# Patient Record
Sex: FEMALE | Race: WHITE | Employment: PART TIME | URBAN - METROPOLITAN AREA
[De-identification: names, ages, dates, MRNs, and addresses within clinical notes are randomized per-mention and may not be internally consistent; named-entity substitution may affect disease eponyms.]

---

## 2017-02-17 ENCOUNTER — RADIANT APPOINTMENT (OUTPATIENT)
Dept: MAMMOGRAPHY | Facility: CLINIC | Age: 77
End: 2017-02-17

## 2017-02-17 DIAGNOSIS — Z12.31 VISIT FOR SCREENING MAMMOGRAM: ICD-10-CM

## 2017-03-22 ENCOUNTER — TELEPHONE (OUTPATIENT)
Dept: DERMATOLOGY | Facility: CLINIC | Age: 77
End: 2017-03-22

## 2017-03-22 NOTE — TELEPHONE ENCOUNTER
Patient feels that she is being bit in the night by a bug of some sort. Right now she has 4 small red raised bumps on her upper thigh that itch. Denies flaking or bleeding. She sates this has happened 3 different times now in the past few months. She uses hydrocortisone and areas resolve within a week.  sleeps in same bed and has not had anything similar. Patient is wondering if she should come into clinic for evaluation of spots to see if they are bug bites.    Routing to provider for recommendations.

## 2017-03-22 NOTE — TELEPHONE ENCOUNTER
----- Message from Kade Paez sent at 3/22/2017  3:17 PM CDT -----  Regarding: CALL BACK  Contact: [phone]  Pt is requesting a call back concerning, some medical questions.    Thank you  JH

## 2017-03-23 NOTE — TELEPHONE ENCOUNTER
Left message with  for patient to call clinic. When patient calls back please assist her in scheduling an appointment with Dr Rinaldi (gave okay to add to clinic) or to be seen with Jojo.

## 2017-03-29 NOTE — TELEPHONE ENCOUNTER
Left message on voice mail for patient to call clinic. If patient does not call back at end of day encounter to be closed per protocol.

## 2017-09-27 ENCOUNTER — OFFICE VISIT (OUTPATIENT)
Dept: DERMATOLOGY | Facility: CLINIC | Age: 77
End: 2017-09-27

## 2017-09-27 DIAGNOSIS — D22.9 MULTIPLE NEVI: ICD-10-CM

## 2017-09-27 DIAGNOSIS — Z85.828 HISTORY OF SKIN CANCER: Primary | ICD-10-CM

## 2017-09-27 RX ORDER — HYDROXYZINE HYDROCHLORIDE 10 MG/5ML
SYRUP ORAL
COMMUNITY
Start: 2017-02-16

## 2017-09-27 RX ORDER — ESZOPICLONE 2 MG/1
TABLET, FILM COATED ORAL
COMMUNITY

## 2017-09-27 ASSESSMENT — PAIN SCALES - GENERAL: PAINLEVEL: NO PAIN (0)

## 2017-09-27 NOTE — PROGRESS NOTES
"[facility] Dermatology Note    Dermatology Problem List:  1. Alopecia.   2. Nodular basal cell carcinoma, left anterior base of neck, status post Mohs 10/09/2013.   3. Recurrent chest/neck rash--resolved  4. Nevus to monitor, right posterior shoulder    Encounter Date: Sep 27, 2017    CC:   No chief complaint on file.    History of Present Illness:  Ms. Rosalva Ellsworth is a 77 year old female who returns for follow-up. She was last seen on 9/14/16 and had continued complaints of chest/neck asymptomatic rash that present after sun exposure, but no evidence of skin changes were seen at that time.    She no longer complains of neck rash.  Denies tender, bleeding, not healing or otherwise symptomatic lesions.  Complains of \"keratoses\", specifically a small one on the right upper forehead.      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       Family History:  There is no family history of skin cancer.    Medications:  Current Outpatient Prescriptions   Medication Sig Dispense Refill     traZODone (DESYREL) 50 MG tablet Take 50 mg by mouth       simvastatin (ZOCOR) 20 MG tablet Take 20 mg by mouth       estradiol (VAGIFEM) 10 MCG TABS Place 10 mcg vaginally       fluticasone (FLONASE) 50 MCG/ACT nasal spray        cholecalciferol 2000 UNITS tablet Take 2,000 Units by mouth       calcium carbonate (OS-DIVYA 500 MG [place]. [place]) 500 MG tablet Take 500 mg by mouth       dorzolamide-timolol (COSOPT) 2-0.5 % ophthalmic solution        dicyclomine (BENTYL) 10 MG capsule Take 10 mg by mouth       Levothyroxine Sodium (SYNTHROID PO) Take 80 mg " by mouth daily       atorvastatin (LIPITOR) 10 MG tablet Take 10 mg by mouth daily.          Allergies   Allergen Reactions     Penicillins Hives     Sulfa Drugs Unknown     rash     Review of Systems:  General: No recent infections, fevers, chills, malaise, arthralgias, unexplained weight/appetite changes  Skin: No new/changing moles, nothing bleeding/nonhealing/painful/tender/rapidly-growing.  Lymphatic: No subcutaneous lumps/bumps.    Physical exam:  Vitals: There were no vitals taken for this visit.  GEN: Well-appearing female, no discomfort or distress, cooperative with the exam  SKIN: Total body examination of the scalp, face, ears, chest, back, abdomen, bilateral upper and lower extremities, mouth and nails:  - phototype II, mild-moderate dermatoheliosis, inreased laxity on the proximal arms, volar forearms.  - R posterior shoulder: 4 mm light brown macule with a central clearing of a reticular pigment network and 4 pigment granules at the 9 o'clock aspect.   - R distal medial thigh: 5 mm medium brown discreet macule, no concerning dermoscopic features   - scattered hyperpigmented macules on sun-exposed areas without pigment network  - well-healed scar at anterior left base of neck  - No other lesions of concern on areas examined.     Impression/Plan:  1. H/o NMSC  - no evidence of recurrent disease    2. Multiple melanocytic nevi. No concerning features today.  - advised daily photoprotection with SPF 30+ sunscreen  - reviewed the ABCDE of melanoma    3. Seborrheic keratoses  - Discussed etiology, natural history, and reassured of the benign nature of these lesions. No treatment required.    Follow-up: 1 year.    Discussed and examined with [facility] staff dermatologist Dr. Frances Rinaldi.    Derrick German MD  PGY-2 Dermatology  (p) [phone]        .I, Frances Rinaldi MD, saw this patient with the resident and agree with the resident s findings and plan of care as documented in the resident s  note.

## 2017-09-27 NOTE — NURSING NOTE
Dermatology Rooming Note    Rosalva Ellsworth's goals for this visit include:   Chief Complaint   Patient presents with     Skin Check     Rosalva is here today for a skin check. No concerns     TAMMY Thurman

## 2017-09-27 NOTE — MR AVS SNAPSHOT
After Visit Summary   2017    Rosalva Ellsworth    MRN: [id number]           Patient Information     Date Of Birth          1940        Visit Information        Provider Department      2017 11:15 AM Frances Rinaldi MD [facility] Dermatology        Care Instructions    Your skin looks great and no concerning spots were identified!            Follow-ups after your visit        Follow-up notes from your care team     Return in about 1 year (around 2018).      Who to contact     Please call your clinic at [phone] to:    Ask questions about your health    Make or cancel appointments    Discuss your medicines    Learn about your test results    Speak to your doctor   If you have compliments or concerns about an experience at your clinic, or if you wish to file a complaint, please contact [facility] Patient Relations at [phone] or email us at [email or web address]         Additional Information About Your Visit        MyChart Information     [employer] is an electronic gateway that provides easy, online access to your medical records. With [employer], you can request a clinic appointment, read your test results, renew a prescription or communicate with your care team.     To sign up for [employer] visit the website at [email or web address]   You will be asked to enter the access code listed below, as well as some personal information. Please follow the directions to create your username and password.     Your access code is: [email or web address]  Expires: 12/3/2017  6:30 AM     Your access code will  in 90 days. If you need help or a new code, please contact your [facility] Clinic or call [phone] for assistance.        Care EveryWhere ID     This is your Care EveryWhere ID. This could be used by other organizations to access your [facility] medical records  [id number]         Blood Pressure from Last 3  Encounters:   09/14/16 120/70   05/03/16 109/65   03/03/15 132/79    Weight from Last 3 Encounters:   05/03/16 55.5 kg (122 lb 6.4 oz)   03/03/15 54.9 kg (121 lb)   08/20/14 57.3 kg (126 lb 6.4 oz)              Today, you had the following     No orders found for display       Primary Care Provider Office Phone # Fax #    Otf Sanders [phone]       [facility] [address] [facility] [address]  [facility] [address]        Equal Access to Services     [facility]: Hadii aad ku hadasho Soomaali, waaxda luqadaha, qaybta kaalmada adeegyada, josee appiah . So [facility] [phone].    ATENCIÓN: Si habla español, tiene a figueroa disposición servicios gratuitos de asistencia lingüística. [facility] [phone].    We comply with applicable federal civil rights laws and [place] laws. We do not discriminate on the basis of race, color, national origin, age, disability sex, sexual orientation or gender identity.            Thank you!     Thank you for choosing [facility] DERMATOLOGY  for your care. Our goal is always to provide you with excellent care. Hearing back from our patients is one way we can continue to improve our services. Please take a few minutes to complete the written survey that you may receive in the mail after your visit with us. Thank you!             Your Updated Medication List - Protect others around you: Learn how to safely use, store and throw away your medicines at www.disposemymeds.org.          This list is accurate as of: 9/27/17 11:34 AM.  Always use your most recent med list.                   Brand Name Dispense Instructions for use Diagnosis    calcium carbonate 1250 MG tablet    OS-DIVYA 500 mg [place]. [place]     Take 500 mg by mouth        cholecalciferol 2000 UNITS tablet      Take 2,000 Units by mouth        dicyclomine 10 MG capsule    BENTYL     Take 10 mg by mouth        dorzolamide-timolol 2-0.5 % ophthalmic solution    COSOPT          estradiol 10 MCG  Tabs vaginal tablet    VAGIFEM     Place 10 mcg vaginally        eszopiclone 2 MG tablet    LUNESTA          fluticasone 50 MCG/ACT spray    FLONASE          LIPITOR 10 MG tablet   Generic drug:  atorvastatin      Take 10 mg by mouth daily.        simvastatin 20 MG tablet    ZOCOR     Take 20 mg by mouth        SYNTHROID PO      Take 80 mg by mouth daily        traZODone 50 MG tablet    DESYREL     Take 50 mg by mouth        WAL-FINATE 4 MG tablet   Generic drug:  chlorpheniramine      TK 1 T PO HS PRF ALLERGIES.

## 2017-09-27 NOTE — LETTER
"9/27/2017       RE: Rosalva Ellsworth  [address] [place] [facility] [address]     Dear Colleague,    Thank you for referring your patient, Rosalva Ellsworth, to the [facility] DERMATOLOGY at [facility]. Please see a copy of my visit note below.    [facility] Dermatology Note    Dermatology Problem List:  1. Alopecia.   2. Nodular basal cell carcinoma, left anterior base of neck, status post Mohs 10/09/2013.   3. Recurrent chest/neck rash--resolved  4. Nevus to monitor, right posterior shoulder    Encounter Date: Sep 27, 2017    CC:   No chief complaint on file.    History of Present Illness:  Ms. Rosalva Ellsworth is a 77 year old female who returns for follow-up. She was last seen on 9/14/16 and had continued complaints of chest/neck asymptomatic rash that present after sun exposure, but no evidence of skin changes were seen at that time.    She no longer complains of neck rash.  Denies tender, bleeding, not healing or otherwise symptomatic lesions.  Complains of \"keratoses\", specifically a small one on the right upper forehead.      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       Family History:  There is no family history of skin cancer.    Medications:  Current Outpatient Prescriptions   Medication Sig Dispense Refill     traZODone (DESYREL) 50 MG tablet Take 50 mg by mouth       simvastatin (ZOCOR) 20 MG tablet Take 20 mg by mouth       estradiol (VAGIFEM) 10 MCG TABS Place 10 mcg vaginally       fluticasone (FLONASE) 50 MCG/ACT nasal spray        cholecalciferol 2000 " UNITS tablet Take 2,000 Units by mouth       calcium carbonate (OS-DIVYA 500 MG [place]. [place]) 500 MG tablet Take 500 mg by mouth       dorzolamide-timolol (COSOPT) 2-0.5 % ophthalmic solution        dicyclomine (BENTYL) 10 MG capsule Take 10 mg by mouth       Levothyroxine Sodium (SYNTHROID PO) Take 80 mg by mouth daily       atorvastatin (LIPITOR) 10 MG tablet Take 10 mg by mouth daily.          Allergies   Allergen Reactions     Penicillins Hives     Sulfa Drugs Unknown     rash     Review of Systems:  General: No recent infections, fevers, chills, malaise, arthralgias, unexplained weight/appetite changes  Skin: No new/changing moles, nothing bleeding/nonhealing/painful/tender/rapidly-growing.  Lymphatic: No subcutaneous lumps/bumps.    Physical exam:  Vitals: There were no vitals taken for this visit.  GEN: Well-appearing female, no discomfort or distress, cooperative with the exam  SKIN: Total body examination of the scalp, face, ears, chest, back, abdomen, bilateral upper and lower extremities, mouth and nails:  - phototype II, mild-moderate dermatoheliosis, inreased laxity on the proximal arms, volar forearms.  - R posterior shoulder: 4 mm light brown macule with a central clearing of a reticular pigment network and 4 pigment granules at the 9 o'clock aspect.   - R distal medial thigh: 5 mm medium brown discreet macule, no concerning dermoscopic features   - scattered hyperpigmented macules on sun-exposed areas without pigment network  - well-healed scar at anterior left base of neck  - No other lesions of concern on areas examined.     Impression/Plan:  1. H/o NMSC  - no evidence of recurrent disease    2. Multiple melanocytic nevi. No concerning features today.  - advised daily photoprotection with SPF 30+ sunscreen  - reviewed the ABCDE of melanoma    3. Seborrheic keratoses  - Discussed etiology, natural history, and reassured of the benign nature of these lesions. No treatment required.    Follow-up: 1  year.    Discussed and examined with [facility] staff dermatologist Dr. Frances Rinaldi.    Derrick German MD  PGY-2 Dermatology  (p) [phone]        .I, Frances Rinaldi MD, saw this patient with the resident and agree with the resident s findings and plan of care as documented in the resident s note.    Again, thank you for allowing me to participate in the care of your patient.      Sincerely,    Frances Rinaldi MD

## 2018-03-19 ENCOUNTER — RADIANT APPOINTMENT (OUTPATIENT)
Dept: MAMMOGRAPHY | Facility: CLINIC | Age: 78
End: 2018-03-19
Payer: COMMERCIAL

## 2018-03-19 DIAGNOSIS — Z12.31 VISIT FOR SCREENING MAMMOGRAM: ICD-10-CM

## 2018-09-05 ENCOUNTER — OFFICE VISIT (OUTPATIENT)
Dept: DERMATOLOGY | Facility: CLINIC | Age: 78
End: 2018-09-05
Payer: COMMERCIAL

## 2018-09-05 DIAGNOSIS — L57.0 AK (ACTINIC KERATOSIS): Primary | ICD-10-CM

## 2018-09-05 DIAGNOSIS — Z85.828 HISTORY OF SKIN CANCER: ICD-10-CM

## 2018-09-05 DIAGNOSIS — L82.0 INFLAMED SEBORRHEIC KERATOSIS: ICD-10-CM

## 2018-09-05 ASSESSMENT — PAIN SCALES - GENERAL
PAINLEVEL: NO PAIN (0)
PAINLEVEL: NO PAIN (1)

## 2018-09-05 NOTE — LETTER
9/5/2018       RE: Rosalva Ellsworth  [address]  [place] [address]     Dear Colleague,    Thank you for referring your patient, Rosalva Ellsworth, to the [facility] DERMATOLOGY at [facility]. Please see a copy of my visit note below.    [facility] Dermatology Note      Dermatology Problem List:  1. H/o alopecia  2. H/o nodular basal cell carcinoma on left upper chest, s/p Mohs 10/2013, no recurrence  3. Photosensitivity on upper chest  4. Seborrheic keratosis, on chest, back, and legs  5. Actinic keratosis on left ear s/p cryotherapy 9/2018       Encounter Date: Sep 5, 2018    CC:  Chief Complaint   Patient presents with     Derm Problem     Rosalva is here for a skin check, states she has concerning areas around her neck that are sun sensitive.          History of Present Illness:  Ms. Rosalva Ellsworth is a 78 year old female who presents for an annual skin check. She reports multiple rough patches and ongoing photosensitivity on her neck and chest. She notes new keratosis on her right lateral chest that is irritated and itchy. No other skin concerns today.     Past Medical History:   Patient Active Problem List   Diagnosis     Atypical nevi     BCC (basal cell carcinoma), trunk     Skin exam, screening for cancer     Past Medical History:   Diagnosis Date     Basal cell carcinoma      Past Surgical History:   Procedure Laterality Date     MOHS MICROGRAPHIC PROCEDURE         Social History:  Social History     Social History     Marital status:      Spouse name: N/A     Number of children: N/A     Years of education: N/A     Occupational History     Not on file.     Social History Main Topics     Smoking status: Never Smoker     Smokeless tobacco: Never Used     Alcohol use Not on file     Drug use: Not on file     Sexual activity: Not on file     Other Topics Concern     Not on file     Social History Narrative        Family History:  Family History   Problem Relation Age of Onset     Cancer No family hx of      skin cancer     Melanoma No family hx of      Skin Cancer No family hx of        Medications:  Current Outpatient Prescriptions   Medication Sig Dispense Refill     atorvastatin (LIPITOR) 10 MG tablet Take 10 mg by mouth daily.       calcium carbonate (OS-DIVYA 500 MG [place]. [place]) 500 MG tablet Take 500 mg by mouth       chlorpheniramine (WAL-FINATE) 4 MG tablet TK 1 T PO HS PRF ALLERGIES.       cholecalciferol 2000 UNITS tablet Take 2,000 Units by mouth       dicyclomine (BENTYL) 10 MG capsule Take 10 mg by mouth       dorzolamide-timolol (COSOPT) 2-0.5 % ophthalmic solution        estradiol (VAGIFEM) 10 MCG TABS Place 10 mcg vaginally       eszopiclone (LUNESTA) 2 MG tablet        fluticasone (FLONASE) 50 MCG/ACT nasal spray        Levothyroxine Sodium (SYNTHROID PO) Take 80 mg by mouth daily       simvastatin (ZOCOR) 20 MG tablet Take 20 mg by mouth       traZODone (DESYREL) 50 MG tablet Take 50 mg by mouth       Allergies   Allergen Reactions     Penicillins Hives     Sulfa Drugs Unknown     rash         Review of Systems:  - Skin: As above in HPI. No additional skin concerns.    Physical exam:  Vitals: There were no vitals taken for this visit.  GEN: This is a well developed, well-nourished female in no acute distress, in a pleasant mood.    SKIN: Total skin excluding the undergarment areas was performed. The exam included the head/face, neck, both arms, chest, back, abdomen, both legs, digits and/or nails.   - Rough pink papule on upper right ear   - Small white papules across forehead  - Multiple rough pink papules on neck  - Brown waxy stuck-on plaque on right lateral chest, irritated, approximately 1 cm   - Multiple brown waxy stuck-on papules on back and legs  -scattered nevi-benign with stable nevus of right inner thigh.  No atypia on dermoscopy  -No other lesions of concern on areas examined.      Impression/Plan:  1. Actinic keratosis on right upper ear    Cryotherapy procedure note (performed by faculty): After verbal consent and discussion of risks and benefits including but no limited to dyspigmentation/scar, blister, infection, recurrence, upper right ear was treated with 1-2mm freeze border for 2 cycles with liquid nitrogen. Post cryotherapy instructions were provided.     2. Seborrheic keratosis on right lateral chest, symptomatic    Cryotherapy procedure note (performed by faculty): After verbal consent and discussion of risks and benefits including but no limited to dyspigmentation/scar, blister, infection, recurrence, right lateral chest lesion was treated with 1-2mm freeze border for 2 cycles with liquid nitrogen. Post cryotherapy instructions were provided.     3. History of nonmelanoma skin cancer, no clincial evidence of recurrence    Sun precaution was advised including the use of sun screens of SPF 30 or higher, sun protective clothing, and avoidance of tanning beds.    Follow-up in 3 months, earlier for new or changing lesions.     Mitzi Bach ([facility]), MS4    Staff Involved:  .I was present with the medical student who participated in the service and in the documentation of the note. I have verified the history and personally performed the physical exam and medical decision making. I agree with the assessment and plan of care as documented in the note.  Frances Rinaldi MD      Again, thank you for allowing me to participate in the care of your patient.      Sincerely,    Frances Rinaldi MD

## 2018-09-05 NOTE — PATIENT INSTRUCTIONS
Cryotherapy    What is it?    Use of a very cold liquid, such as liquid nitrogen, to freeze and destroy abnormal skin cells that need to be removed    What should I expect?    Tenderness and redness    A small blister that might grow and fill with dark purple blood. There may be crusting.    More than one treatment may be needed if the lesions do not go away.    How do I care for the treated area?    Gently wash the area with your hands when bathing.    Use a thin layer of Vaseline to help with healing. You may use a Band-Aid.     The area should heal within 7-10 days and may leave behind a pink or lighter color.     Do not use an antibiotic or Neosporin ointment.     You may take acetaminophen (Tylenol) for pain.     Call your Doctor if you have:    Severe pain    Signs of infection (warmth, redness, cloudy yellow drainage, and or a bad smell)    Questions or concerns    Who should I call with questions?       [facility]: [phone]       [facility]: [phone]       For urgent needs outside of business hours call the [facility] at [phone]        and ask for the dermatology resident on call

## 2018-09-05 NOTE — NURSING NOTE
Dermatology Rooming Note    Rosalva Ellsworth's goals for this visit include:   Chief Complaint   Patient presents with     Derm Problem     Rosalva is here for a skin check, states she has concerning areas around her neck that are sun sensitive.        Dinorah Li LPN

## 2018-09-05 NOTE — MR AVS SNAPSHOT
After Visit Summary   9/5/2018    Rosalva Ellsworth    MRN: [id number]           Patient Information     Date Of Birth          1940        Visit Information        Provider Department      9/5/2018 11:00 AM Frances Rinaldi MD [facility]        Care Instructions    Cryotherapy    What is it?    Use of a very cold liquid, such as liquid nitrogen, to freeze and destroy abnormal skin cells that need to be removed    What should I expect?    Tenderness and redness    A small blister that might grow and fill with dark purple blood. There may be crusting.    More than one treatment may be needed if the lesions do not go away.    How do I care for the treated area?    Gently wash the area with your hands when bathing.    Use a thin layer of Vaseline to help with healing. You may use a Band-Aid.     The area should heal within 7-10 days and may leave behind a pink or lighter color.     Do not use an antibiotic or Neosporin ointment.     You may take acetaminophen (Tylenol) for pain.     Call your Doctor if you have:    Severe pain    Signs of infection (warmth, redness, cloudy yellow drainage, and or a bad smell)    Questions or concerns    Who should I call with questions?       [facility]: [phone]       [facility]: [phone]       For urgent needs outside of business hours call the [facility] at [phone]        and ask for the dermatology resident on call            Follow-ups after your visit        Your next 10 appointments already scheduled     Dec 10, 2018 11:00 AM CST   (Arrive by 10:45 AM)   Return Visit with Frances Rinaldi MD   [facility] ([facility])    [address] [place] [id number] [phone]              Who to contact     Please call your clinic at [phone] to:    Ask questions about your health    Make or  cancel appointments    Discuss your medicines    Learn about your test results    Speak to your doctor            Additional Information About Your Visit        MyChart Information     [employer] is an electronic gateway that provides easy, online access to your medical records. With [email or web address], you can request a clinic appointment, read your test results, renew a prescription or communicate with your care team.     To sign up for [employer] visit the website at [email or web address]   You will be asked to enter the access code listed below, as well as some personal information. Please follow the directions to create your username and password.     Your access code is: [email or web address]  Expires: 2018  6:30 AM     Your access code will  in 90 days. If you need help or a new code, please contact your [facility] or call [phone] for assistance.        Care EveryWhere ID     This is your Care EveryWhere ID. This could be used by other organizations to access your [place] medical records  [id number]         Blood Pressure from Last 3 Encounters:   16 120/70   16 109/65   03/03/15 132/79    Weight from Last 3 Encounters:   16 55.5 kg (122 lb 6.4 oz)   03/03/15 54.9 kg (121 lb)   14 57.3 kg (126 lb 6.4 oz)              Today, you had the following     No orders found for display       Primary Care Provider Office Phone # Fax #    Otf Sanders [phone]       [facility] [address] [facility] [address]  [facility] [address]        Equal Access to Services     HILARIA GUY : Hadii aad ku hadasho Soomaali, waaxda luqadaha, qaybta kaalmada adeegyada, josee carlton. So [facility] [phone].    ATENCIÓN: Si habla español, tiene a figueroa disposición servicios gratuitos de asistencia lingüística. Llame al [phone].    We comply with applicable federal civil rights laws and [place] laws. We do not discriminate on the  basis of race, color, national origin, age, disability, sex, sexual orientation, or gender identity.            Thank you!     Thank you for choosing [facility]  for your care. Our goal is always to provide you with excellent care. Hearing back from our patients is one way we can continue to improve our services. Please take a few minutes to complete the written survey that you may receive in the mail after your visit with us. Thank you!             Your Updated Medication List - Protect others around you: Learn how to safely use, store and throw away your medicines at www.disposemymeds.org.          This list is accurate as of 9/5/18 11:35 AM.  Always use your most recent med list.                   Brand Name Dispense Instructions for use Diagnosis    calcium carbonate 500 mg {elemental} 500 MG tablet    OS-DIVYA     Take 500 mg by mouth        cholecalciferol 2000 units tablet      Take 2,000 Units by mouth        dicyclomine 10 MG capsule    BENTYL     Take 10 mg by mouth        dorzolamide-timolol 2-0.5 % ophthalmic solution    COSOPT          estradiol 10 MCG Tabs vaginal tablet    VAGIFEM     Place 10 mcg vaginally        eszopiclone 2 MG tablet    LUNESTA          fluticasone 50 MCG/ACT spray    FLONASE          LIPITOR 10 MG tablet   Generic drug:  atorvastatin      Take 10 mg by mouth daily.        simvastatin 20 MG tablet    ZOCOR     Take 20 mg by mouth        SYNTHROID PO      Take 80 mg by mouth daily        traZODone 50 MG tablet    DESYREL     Take 50 mg by mouth        WAL-FINATE 4 MG tablet   Generic drug:  chlorpheniramine      TK 1 T PO HS PRF ALLERGIES.

## 2018-09-05 NOTE — PROGRESS NOTES
[facility] Dermatology Note      Dermatology Problem List:  1. H/o alopecia  2. H/o nodular basal cell carcinoma on left upper chest, s/p Mohs 10/2013, no recurrence  3. Photosensitivity on upper chest  4. Seborrheic keratosis, on chest, back, and legs  5. Actinic keratosis on left ear s/p cryotherapy 9/2018       Encounter Date: Sep 5, 2018    CC:  Chief Complaint   Patient presents with     Derm Problem     Rosalva is here for a skin check, states she has concerning areas around her neck that are sun sensitive.          History of Present Illness:  Ms. Rosalva Ellsworth is a 78 year old female who presents for an annual skin check. She reports multiple rough patches and ongoing photosensitivity on her neck and chest. She notes new keratosis on her right lateral chest that is irritated and itchy. No other skin concerns today.     Past Medical History:   Patient Active Problem List   Diagnosis     Atypical nevi     BCC (basal cell carcinoma), trunk     Skin exam, screening for cancer     Past Medical History:   Diagnosis Date     Basal cell carcinoma      Past Surgical History:   Procedure Laterality Date     MOHS MICROGRAPHIC PROCEDURE         Social History:  Social History     Social History     Marital status:      Spouse name: N/A     Number of children: N/A     Years of education: N/A     Occupational History     Not on file.     Social History Main Topics     Smoking status: Never Smoker     Smokeless tobacco: Never Used     Alcohol use Not on file     Drug use: Not on file     Sexual activity: Not on file     Other Topics Concern     Not on file     Social History Narrative       Family History:  Family History   Problem Relation Age of Onset     Cancer No family hx of      skin cancer     Melanoma No family hx of      Skin Cancer No family hx of        Medications:  Current Outpatient Prescriptions   Medication Sig Dispense Refill     atorvastatin (LIPITOR) 10 MG tablet Take  10 mg by mouth daily.       calcium carbonate (OS-DIVYA 500 MG [place]. [place]) 500 MG tablet Take 500 mg by mouth       chlorpheniramine (WAL-FINATE) 4 MG tablet TK 1 T PO HS PRF ALLERGIES.       cholecalciferol 2000 UNITS tablet Take 2,000 Units by mouth       dicyclomine (BENTYL) 10 MG capsule Take 10 mg by mouth       dorzolamide-timolol (COSOPT) 2-0.5 % ophthalmic solution        estradiol (VAGIFEM) 10 MCG TABS Place 10 mcg vaginally       eszopiclone (LUNESTA) 2 MG tablet        fluticasone (FLONASE) 50 MCG/ACT nasal spray        Levothyroxine Sodium (SYNTHROID PO) Take 80 mg by mouth daily       simvastatin (ZOCOR) 20 MG tablet Take 20 mg by mouth       traZODone (DESYREL) 50 MG tablet Take 50 mg by mouth       Allergies   Allergen Reactions     Penicillins Hives     Sulfa Drugs Unknown     rash         Review of Systems:  - Skin: As above in HPI. No additional skin concerns.    Physical exam:  Vitals: There were no vitals taken for this visit.  GEN: This is a well developed, well-nourished female in no acute distress, in a pleasant mood.    SKIN: Total skin excluding the undergarment areas was performed. The exam included the head/face, neck, both arms, chest, back, abdomen, both legs, digits and/or nails.   - Rough pink papule on upper right ear   - Small white papules across forehead  - Multiple rough pink papules on neck  - Brown waxy stuck-on plaque on right lateral chest, irritated, approximately 1 cm   - Multiple brown waxy stuck-on papules on back and legs  -scattered nevi-benign with stable nevus of right inner thigh.  No atypia on dermoscopy  -No other lesions of concern on areas examined.     Impression/Plan:  1. Actinic keratosis on right upper ear    Cryotherapy procedure note (performed by faculty): After verbal consent and discussion of risks and benefits including but no limited to dyspigmentation/scar, blister, infection, recurrence, upper right ear was treated with 1-2mm freeze border for 2 cycles  with liquid nitrogen. Post cryotherapy instructions were provided.     2. Seborrheic keratosis on right lateral chest, symptomatic    Cryotherapy procedure note (performed by faculty): After verbal consent and discussion of risks and benefits including but no limited to dyspigmentation/scar, blister, infection, recurrence, right lateral chest lesion was treated with 1-2mm freeze border for 2 cycles with liquid nitrogen. Post cryotherapy instructions were provided.     3. History of nonmelanoma skin cancer, no clincial evidence of recurrence    Sun precaution was advised including the use of sun screens of SPF 30 or higher, sun protective clothing, and avoidance of tanning beds.    Follow-up in 3 months, earlier for new or changing lesions.     Mitzi Bach ([facility]), MS4    Staff Involved:  .I was present with the medical student who participated in the service and in the documentation of the note. I have verified the history and personally performed the physical exam and medical decision making. I agree with the assessment and plan of care as documented in the note.  Frances Rinaldi MD

## 2018-12-10 ENCOUNTER — OFFICE VISIT (OUTPATIENT)
Dept: DERMATOLOGY | Facility: CLINIC | Age: 78
End: 2018-12-10
Payer: COMMERCIAL

## 2018-12-10 DIAGNOSIS — L57.0 ACTINIC KERATOSIS: Primary | ICD-10-CM

## 2018-12-10 ASSESSMENT — PAIN SCALES - GENERAL: PAINLEVEL: NO PAIN (0)

## 2018-12-10 NOTE — PATIENT INSTRUCTIONS
Sun protective clothing and Resources     L[place] End (www.eVendor Check.com)  Athleta (www.[email or web address].Radient Technologies)  Emma Life ([email or web address] Technologies)  Carve Designs (D [employer] K interprises) - affordable  Skinz ([employer] Travelskinz.com)'    Cooligard    Long sleeve - Piedad Cool DRI UPF 50 or [place] PFG UPF 50  Hoodie - [place] PFG UPF 50  Swimshirt/Rash Guard - Bharti UPF 50 (on Amazon)  Neck - Outdoor Research Ubertubes (www.outdoorresearch.com)

## 2018-12-10 NOTE — NURSING NOTE
Dermatology Rooming Note    Rosalva Ellsworth's goals for this visit include:   Chief Complaint   Patient presents with     Derm Problem     Rosalva is here to have her right ear rechecked, states the ear feels smoother.        Dinorah Li LPN

## 2018-12-10 NOTE — PROGRESS NOTES
[facility] Dermatology Note      Dermatology Problem List:  1. H/o alopecia  2. H/o nodular basal cell carcinoma on left upper chest, s/p Mohs 10/2013, no recurrence  3. Photosensitivity on upper chest  4. Seborrheic keratosis, on chest, back, and legs  5. Actinic keratosis on left ear s/p cryotherapy 9/2018 , resolved      Encounter Date: Dec 10, 2018    CC:  Chief Complaint   Patient presents with     Derm Problem     Rosalva is here to have her right ear rechecked, states the ear feels smoother.          History of Present Illness:  Ms. Rosalva Ellsworth is a 78 year old female who presents for follow up. She was seen on 9/5/18 for an annual skin check and had LN2 treatment to SK on the R chest and AK on the R upper ear. She is here for a recheck of the AK on her R upper ear.    She reports resolution of the spot with cryotherapy. No redness or residual scale noted. Otherwise, the patient reports no painful, bleeding, nonhealing, or pruritic lesions, and denies new or changing moles.      Past Medical History:   Patient Active Problem List   Diagnosis     Atypical nevi     BCC (basal cell carcinoma), trunk     Skin exam, screening for cancer     Past Medical History:   Diagnosis Date     Basal cell carcinoma      Past Surgical History:   Procedure Laterality Date     MOHS MICROGRAPHIC PROCEDURE         Social History:  Social History     Socioeconomic History     Marital status:      Spouse name: Not on file     Number of children: Not on file     Years of education: Not on file     Highest education level: Not on file   Social Needs     Financial resource strain: Not on file     Food insecurity - worry: Not on file     Food insecurity - inability: Not on file     Transportation needs - medical: Not on file     Transportation needs - non-medical: Not on file   Occupational History     Not on file   Tobacco Use     Smoking status: Never Smoker     Smokeless tobacco: Never Used    Substance and Sexual Activity     Alcohol use: Not on file     Drug use: Not on file     Sexual activity: Not on file   Other Topics Concern     Parent/sibling w/ CABG, MI or angioplasty before 65F 55M? Not Asked   Social History Narrative     Not on file       Family History:  Family History   Problem Relation Age of Onset     Cancer No family hx of         skin cancer     Melanoma No family hx of      Skin Cancer No family hx of        Medications:  Current Outpatient Medications   Medication Sig Dispense Refill     atorvastatin (LIPITOR) 10 MG tablet Take 10 mg by mouth daily.       calcium carbonate (OS-DIVYA 500 MG [place]. [place]) 500 MG tablet Take 500 mg by mouth       chlorpheniramine (WAL-FINATE) 4 MG tablet TK 1 T PO HS PRF ALLERGIES.       cholecalciferol 2000 UNITS tablet Take 2,000 Units by mouth       dicyclomine (BENTYL) 10 MG capsule Take 10 mg by mouth       dorzolamide-timolol (COSOPT) 2-0.5 % ophthalmic solution        estradiol (VAGIFEM) 10 MCG TABS Place 10 mcg vaginally       eszopiclone (LUNESTA) 2 MG tablet        fluticasone (FLONASE) 50 MCG/ACT nasal spray        Levothyroxine Sodium (SYNTHROID PO) Take 80 mg by mouth daily       simvastatin (ZOCOR) 20 MG tablet Take 20 mg by mouth       traZODone (DESYREL) 50 MG tablet Take 50 mg by mouth       Allergies   Allergen Reactions     Penicillins Hives     Sulfa Drugs Unknown     rash         Review of Systems:  - Skin: As above in HPI. No additional skin concerns.    Physical exam:  Vitals: There were no vitals taken for this visit.  GEN: This is a well developed, well-nourished female in no acute distress, in a pleasant mood.    SKIN: Focused exam of the face was performed.  - no notable scaly pink papules on the R and L helices  - no other concerning lesions on the face    Impression/Plan:  1. Hx of Actinic keratosis on right upper ear, resolved with cryotherapy on 9/2018  - No evidence of recurrence or persistent lesions  - Recommended use of a  broad spectrum sunscreen of at least SPF 30 on all sun exposed sites daily.  Apply 20 minutes prior to exposure and repeat application every two hours or after sweating or swimming.  Avoid any intentional indoor or outdoor tanning.    - provided handout on sun protective clothing    Follow-up in 9 months, earlier for new or changing lesions.     Dr. Rinaldi staffed this patient.    Lizzeth Almazan MD  PGY-2 Medicine-Dermatology  Pager [phone]        .I, Frances Rinaldi MD, saw this patient with the resident and agree with the resident s findings and plan of care as documented in the resident s note.

## 2018-12-10 NOTE — LETTER
12/10/2018       RE: Rosalva Ellsworth  [address]  [facility] [address]     Dear Colleague,    Thank you for referring your patient, Rosalva Ellsworth, to the [facility]ER[facility] at [facility]. Please see a copy of my visit note below.    [facility] Dermatology Note      Dermatology Problem List:  1. H/o alopecia  2. H/o nodular basal cell carcinoma on left upper chest, s/p Mohs 10/2013, no recurrence  3. Photosensitivity on upper chest  4. Seborrheic keratosis, on chest, back, and legs  5. Actinic keratosis on left ear s/p cryotherapy 9/2018 , resolved      Encounter Date: Dec 10, 2018    CC:  Chief Complaint   Patient presents with     Derm Problem     Rosalva is here to have her right ear rechecked, states the ear feels smoother.          History of Present Illness:  Ms. Rosalva Ellsworth is a 78 year old female who presents for follow up. She was seen on 9/5/18 for an annual skin check and had LN2 treatment to SK on the R chest and AK on the R upper ear. She is here for a recheck of the AK on her R upper ear.    She reports resolution of the spot with cryotherapy. No redness or residual scale noted. Otherwise, the patient reports no painful, bleeding, nonhealing, or pruritic lesions, and denies new or changing moles.      Past Medical History:   Patient Active Problem List   Diagnosis     Atypical nevi     BCC (basal cell carcinoma), trunk     Skin exam, screening for cancer     Past Medical History:   Diagnosis Date     Basal cell carcinoma      Past Surgical History:   Procedure Laterality Date     MOHS MICROGRAPHIC PROCEDURE         Social History:  Social History     Socioeconomic History     Marital status:      Spouse name: Not on file     Number of children: Not on file     Years of education: Not on file     Highest education level: Not on file   Social Needs     Financial resource strain: Not on file     Food  insecurity - worry: Not on file     Food insecurity - inability: Not on file     Transportation needs - medical: Not on file     Transportation needs - non-medical: Not on file   Occupational History     Not on file   Tobacco Use     Smoking status: Never Smoker     Smokeless tobacco: Never Used   Substance and Sexual Activity     Alcohol use: Not on file     Drug use: Not on file     Sexual activity: Not on file   Other Topics Concern     Parent/sibling w/ CABG, MI or angioplasty before 65F 55M? Not Asked   Social History Narrative     Not on file       Family History:  Family History   Problem Relation Age of Onset     Cancer No family hx of         skin cancer     Melanoma No family hx of      Skin Cancer No family hx of        Medications:  Current Outpatient Medications   Medication Sig Dispense Refill     atorvastatin (LIPITOR) 10 MG tablet Take 10 mg by mouth daily.       calcium carbonate (OS-DIVYA 500 MG [place]. [place]) 500 MG tablet Take 500 mg by mouth       chlorpheniramine (WAL-FINATE) 4 MG tablet TK 1 T PO HS PRF ALLERGIES.       cholecalciferol 2000 UNITS tablet Take 2,000 Units by mouth       dicyclomine (BENTYL) 10 MG capsule Take 10 mg by mouth       dorzolamide-timolol (COSOPT) 2-0.5 % ophthalmic solution        estradiol (VAGIFEM) 10 MCG TABS Place 10 mcg vaginally       eszopiclone (LUNESTA) 2 MG tablet        fluticasone (FLONASE) 50 MCG/ACT nasal spray        Levothyroxine Sodium (SYNTHROID PO) Take 80 mg by mouth daily       simvastatin (ZOCOR) 20 MG tablet Take 20 mg by mouth       traZODone (DESYREL) 50 MG tablet Take 50 mg by mouth       Allergies   Allergen Reactions     Penicillins Hives     Sulfa Drugs Unknown     rash         Review of Systems:  - Skin: As above in HPI. No additional skin concerns.    Physical exam:  Vitals: There were no vitals taken for this visit.  GEN: This is a well developed, well-nourished female in no acute distress, in a pleasant mood.    SKIN: Focused exam of the  face was performed.  - no notable scaly pink papules on the R and L helices  - no other concerning lesions on the face    Impression/Plan:  1. Hx of Actinic keratosis on right upper ear, resolved with cryotherapy on 9/2018  - No evidence of recurrence or persistent lesions  - Recommended use of a broad spectrum sunscreen of at least SPF 30 on all sun exposed sites daily.  Apply 20 minutes prior to exposure and repeat application every two hours or after sweating or swimming.  Avoid any intentional indoor or outdoor tanning.    - provided handout on sun protective clothing    Follow-up in 9 months, earlier for new or changing lesions.     Dr. Rinaldi staffed this patient.    Lizzeth Almazan MD  PGY-2 Medicine-Dermatology  Pager [phone]      .I, Frances Rinaldi MD, saw this patient with the resident and agree with the resident s findings and plan of care as documented in the resident s note.        Frances Rinaldi MD

## 2019-03-25 ENCOUNTER — ANCILLARY PROCEDURE (OUTPATIENT)
Dept: MAMMOGRAPHY | Facility: CLINIC | Age: 79
End: 2019-03-25
Payer: COMMERCIAL

## 2019-03-25 DIAGNOSIS — Z12.31 VISIT FOR SCREENING MAMMOGRAM: ICD-10-CM

## 2019-10-23 ENCOUNTER — OFFICE VISIT (OUTPATIENT)
Dept: DERMATOLOGY | Facility: CLINIC | Age: 79
End: 2019-10-23
Payer: COMMERCIAL

## 2019-10-23 DIAGNOSIS — Z85.828 HISTORY OF SKIN CANCER: ICD-10-CM

## 2019-10-23 DIAGNOSIS — L82.1 SK (SEBORRHEIC KERATOSIS): Primary | ICD-10-CM

## 2019-10-23 ASSESSMENT — PAIN SCALES - GENERAL: PAINLEVEL: NO PAIN (0)

## 2019-10-23 NOTE — LETTER
10/23/2019       RE: Rosalva Ellsworth  [address]  [facility] [address]     Dear Colleague,    Thank you for referring your patient, Rosalva Ellsworth, to the [facility] DERMATOLOGY at [facility]. Please see a copy of my visit note below.    [facility] Dermatology Note      Dermatology Problem List:  1. H/o alopecia  2. H/o nodular basal cell carcinoma on left upper chest, s/p Mohs 10/2013, no recurrence  3. Photosensitivity on upper chest  4. Seborrheic keratosis, on chest, back, and legs  5. Actinic keratosis on left ear s/p cryotherapy 9/2018 , resolved      Encounter Date: Oct 23, 2019    CC:  Chief Complaint   Patient presents with     Skin Check     Rosalva is here today for a skin check          History of Present Illness:  Ms. Rosalva Ellsworth is a 79 year old female who presents for follow up. She was seen on 12/10/18 for an annual skin check. She is here for her annual skin check today. She reports no concerns with previous cryotherapy spots. No redness or residual scale noted. Otherwise, the patient reports no painful, bleeding, nonhealing, or pruritic lesions, and denies new or changing moles.      Past Medical History:   Patient Active Problem List   Diagnosis     Atypical nevi     BCC (basal cell carcinoma), trunk     Skin exam, screening for cancer     Past Medical History:   Diagnosis Date     Basal cell carcinoma      Past Surgical History:   Procedure Laterality Date     MOHS MICROGRAPHIC PROCEDURE         Social History:  Social History     Socioeconomic History     Marital status:      Spouse name: Not on file     Number of children: Not on file     Years of education: Not on file     Highest education level: Not on file   Occupational History     Not on file   Social Needs     Financial resource strain: Not on file     Food insecurity:     Worry: Not on file     Inability: Not on file     Transportation  needs:     Medical: Not on file     Non-medical: Not on file   Tobacco Use     Smoking status: Never Smoker     Smokeless tobacco: Never Used   Substance and Sexual Activity     Alcohol use: Not on file     Drug use: Not on file     Sexual activity: Not on file   Lifestyle     Physical activity:     Days per week: Not on file     Minutes per session: Not on file     Stress: Not on file   Relationships     Social connections:     Talks on phone: Not on file     Gets together: Not on file     Attends Advent service: Not on file     Active member of club or organization: Not on file     Attends meetings of clubs or organizations: Not on file     Relationship status: Not on file     Intimate partner violence:     Fear of current or ex partner: Not on file     Emotionally abused: Not on file     Physically abused: Not on file     Forced sexual activity: Not on file   Other Topics Concern     Parent/sibling w/ CABG, MI or angioplasty before 65F 55M? Not Asked   Social History Narrative     Not on file       Family History:  Family History   Problem Relation Age of Onset     Cancer No family hx of         skin cancer     Melanoma No family hx of      Skin Cancer No family hx of        Medications:  Current Outpatient Medications   Medication Sig Dispense Refill     atorvastatin (LIPITOR) 10 MG tablet Take 10 mg by mouth daily.       calcium carbonate (OS-DIVYA 500 MG [place]. [place]) 500 MG tablet Take 500 mg by mouth       chlorpheniramine (WAL-FINATE) 4 MG tablet TK 1 T PO HS PRF ALLERGIES.       cholecalciferol 2000 UNITS tablet Take 2,000 Units by mouth       dicyclomine (BENTYL) 10 MG capsule Take 10 mg by mouth       dorzolamide-timolol (COSOPT) 2-0.5 % ophthalmic solution        estradiol (VAGIFEM) 10 MCG TABS Place 10 mcg vaginally       eszopiclone (LUNESTA) 2 MG tablet        fluticasone (FLONASE) 50 MCG/ACT nasal spray        Levothyroxine Sodium (SYNTHROID PO) Take 80 mg by mouth daily       simvastatin (ZOCOR) 20 MG  tablet Take 20 mg by mouth       traZODone (DESYREL) 50 MG tablet Take 50 mg by mouth       Allergies   Allergen Reactions     Penicillins Hives     Sulfa Drugs Unknown     rash         Review of Systems:  - Skin: As above in HPI. No additional skin concerns.    Physical exam:  Vitals: There were no vitals taken for this visit.  GEN: This is a well developed, well-nourished female in no acute distress, in a pleasant mood.    SKIN: Total skin excluding the undergarment areas was performed. The exam included the head/face, neck, both arms, chest, back, abdomen, both legs, digits and/or nails.   - No notable scaly pink papules on the R and L helices  - Small white papules across forehead  - Multiple rough pink papules on neck  - Multiple brown waxy stuck-on papules on back and legs  - Red waxy stuck-on papule on right lower leg  - Scattered nevi-benign with stable nevus of right inner thigh.  No atypia on dermoscopy (nice reticular pattern)  - No other lesions of concern on areas examined.     Impression/Plan:  1. Hx of Actinic keratosis on right upper ear, resolved with cryotherapy on 9/2018    No evidence of recurrence or persistent lesions    Recommended use of a broad spectrum sunscreen of at least SPF 30 on all sun exposed sites daily.  Apply 20 minutes prior to exposure and repeat application every two hours or after sweating or swimming.  Avoid any intentional indoor or outdoor tanning.       2. History of nonmelanoma skin cancer, no clincial evidence of recurrence    Sun precaution was advised including the use of sun screens of SPF 30 or higher, sun protective clothing, and avoidance of tanning beds.    3. Seborrheic keratosis, non irritated    Benign nature was discussed.No further intervention required at this time.     4. Seborrheic keratosis, irritated, right lower leg    Benign nature was discussed.No further intervention required at this time.       CC Referred Self, MD  No address on file on close of  this encounter.  Follow-up in 1 year, earlier for new or changing lesions.     Staff Involved:  I, Luna Barone MS3, saw and examined the patient in the presence of Dr. Rinaldi.     I was present with the medical student who participated in the service and in the documentation of the note. I have verified the history and personally performed the physical exam and medical decision making. I agree with the assessment and plan of care as documented in the note.  Frances Rinaldi MD

## 2019-10-23 NOTE — NURSING NOTE
Dermatology Rooming Note    Rosalva Ellsworth's goals for this visit include:   Chief Complaint   Patient presents with     Skin Check     Rosalva is here today for a skin check      TAMMY Thurman

## 2019-10-23 NOTE — PROGRESS NOTES
[facility] Dermatology Note      Dermatology Problem List:  1. H/o alopecia  2. H/o nodular basal cell carcinoma on left upper chest, s/p Mohs 10/2013, no recurrence  3. Photosensitivity on upper chest  4. Seborrheic keratosis, on chest, back, and legs  5. Actinic keratosis on left ear s/p cryotherapy 9/2018 , resolved      Encounter Date: Oct 23, 2019    CC:  Chief Complaint   Patient presents with     Skin Check     Rosalva is here today for a skin check          History of Present Illness:  Ms. Rosalva Ellsworth is a 79 year old female who presents for follow up. She was seen on 12/10/18 for an annual skin check. She is here for her annual skin check today. She reports no concerns with previous cryotherapy spots. No redness or residual scale noted. Otherwise, the patient reports no painful, bleeding, nonhealing, or pruritic lesions, and denies new or changing moles.      Past Medical History:   Patient Active Problem List   Diagnosis     Atypical nevi     BCC (basal cell carcinoma), trunk     Skin exam, screening for cancer     Past Medical History:   Diagnosis Date     Basal cell carcinoma      Past Surgical History:   Procedure Laterality Date     MOHS MICROGRAPHIC PROCEDURE         Social History:  Social History     Socioeconomic History     Marital status:      Spouse name: Not on file     Number of children: Not on file     Years of education: Not on file     Highest education level: Not on file   Occupational History     Not on file   Social Needs     Financial resource strain: Not on file     Food insecurity:     Worry: Not on file     Inability: Not on file     Transportation needs:     Medical: Not on file     Non-medical: Not on file   Tobacco Use     Smoking status: Never Smoker     Smokeless tobacco: Never Used   Substance and Sexual Activity     Alcohol use: Not on file     Drug use: Not on file     Sexual activity: Not on file   Lifestyle     Physical activity:      Days per week: Not on file     Minutes per session: Not on file     Stress: Not on file   Relationships     Social connections:     Talks on phone: Not on file     Gets together: Not on file     Attends Scientologist service: Not on file     Active member of club or organization: Not on file     Attends meetings of clubs or organizations: Not on file     Relationship status: Not on file     Intimate partner violence:     Fear of current or ex partner: Not on file     Emotionally abused: Not on file     Physically abused: Not on file     Forced sexual activity: Not on file   Other Topics Concern     Parent/sibling w/ CABG, MI or angioplasty before 65F 55M? Not Asked   Social History Narrative     Not on file       Family History:  Family History   Problem Relation Age of Onset     Cancer No family hx of         skin cancer     Melanoma No family hx of      Skin Cancer No family hx of        Medications:  Current Outpatient Medications   Medication Sig Dispense Refill     atorvastatin (LIPITOR) 10 MG tablet Take 10 mg by mouth daily.       calcium carbonate (OS-DIVYA 500 MG [place]. [place]) 500 MG tablet Take 500 mg by mouth       chlorpheniramine (WAL-FINATE) 4 MG tablet TK 1 T PO HS PRF ALLERGIES.       cholecalciferol 2000 UNITS tablet Take 2,000 Units by mouth       dicyclomine (BENTYL) 10 MG capsule Take 10 mg by mouth       dorzolamide-timolol (COSOPT) 2-0.5 % ophthalmic solution        estradiol (VAGIFEM) 10 MCG TABS Place 10 mcg vaginally       eszopiclone (LUNESTA) 2 MG tablet        fluticasone (FLONASE) 50 MCG/ACT nasal spray        Levothyroxine Sodium (SYNTHROID PO) Take 80 mg by mouth daily       simvastatin (ZOCOR) 20 MG tablet Take 20 mg by mouth       traZODone (DESYREL) 50 MG tablet Take 50 mg by mouth       Allergies   Allergen Reactions     Penicillins Hives     Sulfa Drugs Unknown     rash         Review of Systems:  - Skin: As above in HPI. No additional skin concerns.    Physical exam:  Vitals: There were no  vitals taken for this visit.  GEN: This is a well developed, well-nourished female in no acute distress, in a pleasant mood.    SKIN: Total skin excluding the undergarment areas was performed. The exam included the head/face, neck, both arms, chest, back, abdomen, both legs, digits and/or nails.   - No notable scaly pink papules on the R and L helices  - Small white papules across forehead  - Multiple rough pink papules on neck  - Multiple brown waxy stuck-on papules on back and legs  - Red waxy stuck-on papule on right lower leg  - Scattered nevi-benign with stable nevus of right inner thigh.  No atypia on dermoscopy (nice reticular pattern)  - No other lesions of concern on areas examined.     Impression/Plan:  1. Hx of Actinic keratosis on right upper ear, resolved with cryotherapy on 9/2018    No evidence of recurrence or persistent lesions    Recommended use of a broad spectrum sunscreen of at least SPF 30 on all sun exposed sites daily.  Apply 20 minutes prior to exposure and repeat application every two hours or after sweating or swimming.  Avoid any intentional indoor or outdoor tanning.       2. History of nonmelanoma skin cancer, no clincial evidence of recurrence    Sun precaution was advised including the use of sun screens of SPF 30 or higher, sun protective clothing, and avoidance of tanning beds.    3. Seborrheic keratosis, non irritated    Benign nature was discussed.No further intervention required at this time.     4. Seborrheic keratosis, irritated, right lower leg    Benign nature was discussed.No further intervention required at this time.       CC Referred Self, MD  No address on file on close of this encounter.  Follow-up in 1 year, earlier for new or changing lesions.     Staff Involved:  ILuna MS3, saw and examined the patient in the presence of Dr. Rinaldi.     I was present with the medical student who participated in the service and in the documentation of the note. I have  verified the history and personally performed the physical exam and medical decision making. I agree with the assessment and plan of care as documented in the note.  Frances Rinaldi MD

## 2019-12-17 NOTE — TELEPHONE ENCOUNTER
FUTURE VISIT INFORMATION      FUTURE VISIT INFORMATION:    Date: 1.7.20    Time: 3:00    Location:  DermSurg  REFERRAL INFORMATION:    Referring provider:  N/A    Referring providers clinic:  N/A    Reason for visit/diagnosis  consult, cyst cheek    RECORDS REQUESTED FROM:       Clinic name Comments Records Status Imaging Status   [facility] 12.13.19, 12.11.19 - TE Epic N/A

## 2020-01-07 ENCOUNTER — OFFICE VISIT (OUTPATIENT)
Dept: DERMATOLOGY | Facility: CLINIC | Age: 80
End: 2020-01-07
Payer: COMMERCIAL

## 2020-01-07 ENCOUNTER — PRE VISIT (OUTPATIENT)
Dept: DERMATOLOGY | Facility: CLINIC | Age: 80
End: 2020-01-07

## 2020-01-07 DIAGNOSIS — L72.9 CYST OF SKIN: Primary | ICD-10-CM

## 2020-01-07 ASSESSMENT — PAIN SCALES - GENERAL: PAINLEVEL: NO PAIN (0)

## 2020-01-07 NOTE — PROGRESS NOTES
"[facility] Dermatology Note    Dermatology Surgery Clinic  [facility]  [address] [place] [id number]    Dermatology Problem List:  1. H/o alopecia  2. H/o nodular basal cell carcinoma on left upper chest, s/p Mohs 10/2013, no recurrence  3. Photosensitivity on upper chest  4. Seborrheic keratosis, on chest, back, and legs  5. Actinic keratosis on left ear s/p cryotherapy 9/2018 , resolved  6. Cyst, L cheek - discussed excision and patient will consider    Encounter Date: Jan 7, 2020    CC:  Chief Complaint   Patient presents with     Consult     Rosalva is here today for a consult for a cyst on the left cheek. She states that she was referred to dermatology for removal.        History of Present Illness:  Ms. Roaslva Ellsworth is a 79 year old female who comes into the clinic today for consultation for L cheek cyst excision. She states that she was referred to dermatology for removal. Today she reports that the cyst has been present for several years. It has grown a \"little tiny bit\" per pt and is bothersome. She would like to get it removed but does not want to have a scar or stitches. The patient is otherwise feeling well overall. There are no other skin concerns at this time.       Past Medical History:   Patient Active Problem List   Diagnosis     Atypical nevi     BCC (basal cell carcinoma), trunk     Skin exam, screening for cancer     Past Medical History:   Diagnosis Date     Basal cell carcinoma      Past Surgical History:   Procedure Laterality Date     MOHS MICROGRAPHIC PROCEDURE         Social History:  Patient reports that she has never smoked. She has never used smokeless tobacco.    Family History:  Family History   Problem Relation Age of Onset     Cancer No family hx of         skin cancer     Melanoma No family hx of      Skin Cancer No family hx of        Medications:  Current Outpatient Medications   Medication Sig " Dispense Refill     atorvastatin (LIPITOR) 10 MG tablet Take 10 mg by mouth daily.       calcium carbonate (OS-DIVYA 500 MG [place]. [place]) 500 MG tablet Take 500 mg by mouth       chlorpheniramine (WAL-FINATE) 4 MG tablet TK 1 T PO HS PRF ALLERGIES.       cholecalciferol 2000 UNITS tablet Take 2,000 Units by mouth       dicyclomine (BENTYL) 10 MG capsule Take 10 mg by mouth       dorzolamide-timolol (COSOPT) 2-0.5 % ophthalmic solution        estradiol (VAGIFEM) 10 MCG TABS Place 10 mcg vaginally       eszopiclone (LUNESTA) 2 MG tablet        fluticasone (FLONASE) 50 MCG/ACT nasal spray        Levothyroxine Sodium (SYNTHROID PO) Take 80 mg by mouth daily       simvastatin (ZOCOR) 20 MG tablet Take 20 mg by mouth       traZODone (DESYREL) 50 MG tablet Take 50 mg by mouth         Allergies   Allergen Reactions     Penicillins Hives     Sulfa Drugs Unknown     rash       Review of Systems:  -As per HPI  -Constitutional: Otherwise feeling well today, in usual state of health.  -Skin: As above in HPI. No additional skin concerns.    Physical exam:  Vitals: There were no vitals taken for this visit.  GEN: This is a well developed, well-nourished female in no acute distress, in a pleasant mood.    SKIN: Focused examination of the face/scalp, neck, and L cheek were performed.  - Examination of the head and neck are normal except for a 4.0 mm cystic papule on the L zygoma   -No other lesions of concern on areas examined.       Impression/Plan:  1. Cystic papule on the L zygoma, 4.0 mm    Reviewed nature of diagnosis.     Risks, benefits, and process of excision surgery were discussed including possibility of damage to surrounding anatomical structures and infection. The patient is going to consider whether she wants to have excision after discussion of risks and benefits.  She will be scheduled at her convenience.    No indication for pre-op antibiotics.    Pre-op written instructions provided.       CC Referred MD John  No  address on file on close of this encounter.    Follow-up as needed.       Staff Involved:    Scribe Disclosure  I, Breanna Rosalio, am serving as a scribe to document services personally performed by Dr. Mckinley Perdomo, based on data collection and the provider's statements to me.     Attending Attestation  I attest that the Scribe recorded the interview and exam that I personally performed.  I have reviewed the note and edited it as necessary.    Mckinley Perdomo M.D.  Professor  Director of Dermatologic Surgery  Department of Dermatology  [facility]

## 2020-01-07 NOTE — NURSING NOTE
Chief Complaint   Patient presents with     Consult     Rosalva is here today for a consult for a cyst on the left cheek. She states that she was referred to dermatology for removal.    Gretchen Cano CMA

## 2020-01-07 NOTE — LETTER
"1/7/2020       RE: Rosalva Ellsworth  [address]  [facility] [address]     Dear Colleague,    Thank you for referring your patient, Rosalva Ellsworth, to the [facility] DERMATOLOGIC SURGERY at [facility]. Please see a copy of my visit note below.    [facility] Dermatology Note    Dermatology Surgery Clinic  [facility]  Clinics and Surgery Center  [address] [place] [id number]    Dermatology Problem List:  1. H/o alopecia  2. H/o nodular basal cell carcinoma on left upper chest, s/p Mohs 10/2013, no recurrence  3. Photosensitivity on upper chest  4. Seborrheic keratosis, on chest, back, and legs  5. Actinic keratosis on left ear s/p cryotherapy 9/2018 , resolved  6. Cyst, L cheek - discussed excision and patient will consider    Encounter Date: Jan 7, 2020    CC:  Chief Complaint   Patient presents with     Consult     Rosalva is here today for a consult for a cyst on the left cheek. She states that she was referred to dermatology for removal.        History of Present Illness:  Ms. Rosalva Ellsworth is a 79 year old female who comes into the clinic today for consultation for L cheek cyst excision. She states that she was referred to dermatology for removal. Today she reports that the cyst has been present for several years. It has grown a \"little tiny bit\" per pt and is bothersome. She would like to get it removed but does not want to have a scar or stitches. The patient is otherwise feeling well overall. There are no other skin concerns at this time.       Past Medical History:   Patient Active Problem List   Diagnosis     Atypical nevi     BCC (basal cell carcinoma), trunk     Skin exam, screening for cancer     Past Medical History:   Diagnosis Date     Basal cell carcinoma      Past Surgical History:   Procedure Laterality Date     MOHS MICROGRAPHIC PROCEDURE         Social History:  Patient reports " that she has never smoked. She has never used smokeless tobacco.    Family History:  Family History   Problem Relation Age of Onset     Cancer No family hx of         skin cancer     Melanoma No family hx of      Skin Cancer No family hx of        Medications:  Current Outpatient Medications   Medication Sig Dispense Refill     atorvastatin (LIPITOR) 10 MG tablet Take 10 mg by mouth daily.       calcium carbonate (OS-DIVYA 500 MG [place]. [place]) 500 MG tablet Take 500 mg by mouth       chlorpheniramine (WAL-FINATE) 4 MG tablet TK 1 T PO HS PRF ALLERGIES.       cholecalciferol 2000 UNITS tablet Take 2,000 Units by mouth       dicyclomine (BENTYL) 10 MG capsule Take 10 mg by mouth       dorzolamide-timolol (COSOPT) 2-0.5 % ophthalmic solution        estradiol (VAGIFEM) 10 MCG TABS Place 10 mcg vaginally       eszopiclone (LUNESTA) 2 MG tablet        fluticasone (FLONASE) 50 MCG/ACT nasal spray        Levothyroxine Sodium (SYNTHROID PO) Take 80 mg by mouth daily       simvastatin (ZOCOR) 20 MG tablet Take 20 mg by mouth       traZODone (DESYREL) 50 MG tablet Take 50 mg by mouth         Allergies   Allergen Reactions     Penicillins Hives     Sulfa Drugs Unknown     rash       Review of Systems:  -As per HPI  -Constitutional: Otherwise feeling well today, in usual state of health.  -Skin: As above in HPI. No additional skin concerns.    Physical exam:  Vitals: There were no vitals taken for this visit.  GEN: This is a well developed, well-nourished female in no acute distress, in a pleasant mood.    SKIN: Focused examination of the face/scalp, neck, and L cheek were performed.  - Examination of the head and neck are normal except for a 4.0 mm cystic papule on the L zygoma   -No other lesions of concern on areas examined.       Impression/Plan:  1. Cystic papule on the L zygoma, 4.0 mm    Reviewed nature of diagnosis.     Risks, benefits, and process of excision surgery were discussed including possibility of damage to  surrounding anatomical structures and infection. The patient is going to consider whether she wants to have excision after discussion of risks and benefits.  She will be scheduled at her convenience.    No indication for pre-op antibiotics.    Pre-op written instructions provided.       CC Referred Self, MD  No address on file on close of this encounter.    Follow-up as needed.       Staff Involved:    Scribe Disclosure  I, Breanna Rosalio, am serving as a scribe to document services personally performed by Dr. Mckinley Perdomo, based on data collection and the provider's statements to me.     Attending Attestation  I attest that the Scribe recorded the interview and exam that I personally performed.  I have reviewed the note and edited it as necessary.    Mckinley Perdomo M.D.  Professor  Director of Dermatologic Surgery  Department of Dermatology  [facility]

## 2020-06-09 ENCOUNTER — ANCILLARY PROCEDURE (OUTPATIENT)
Dept: MAMMOGRAPHY | Facility: CLINIC | Age: 80
End: 2020-06-09
Attending: INTERNAL MEDICINE
Payer: COMMERCIAL

## 2020-06-09 DIAGNOSIS — Z12.31 SCREENING MAMMOGRAM, ENCOUNTER FOR: ICD-10-CM

## 2020-06-12 ENCOUNTER — ANCILLARY PROCEDURE (OUTPATIENT)
Dept: MAMMOGRAPHY | Facility: CLINIC | Age: 80
End: 2020-06-12
Attending: INTERNAL MEDICINE
Payer: COMMERCIAL

## 2020-06-12 DIAGNOSIS — Z12.31 VISIT FOR SCREENING MAMMOGRAM: ICD-10-CM

## 2020-09-21 ENCOUNTER — OFFICE VISIT (OUTPATIENT)
Dept: DERMATOLOGY | Facility: CLINIC | Age: 80
End: 2020-09-21
Payer: COMMERCIAL

## 2020-09-21 DIAGNOSIS — Z85.828 HISTORY OF SKIN CANCER: Primary | ICD-10-CM

## 2020-09-21 DIAGNOSIS — L81.4 LENTIGO: ICD-10-CM

## 2020-09-21 ASSESSMENT — PAIN SCALES - GENERAL: PAINLEVEL: NO PAIN (0)

## 2020-09-21 NOTE — PROGRESS NOTES
[facility] Dermatology Note      Dermatology Problem List:  1. H/o alopecia  2. H/o nodular basal cell carcinoma on left upper chest, s/p Mohs 10/2013, no recurrence  3. Photosensitivity on upper chest  4. Seborrheic keratosis, on chest, back, and legs  5. Actinic keratosis on left ear s/p cryotherapy 9/2018 , resolved  6. Cyst of left cheek, decided against excision.    Encounter Date: Sep 21, 2020    CC:   Chief Complaint   Patient presents with     Derm Problem     Rosalva is here for a skin check, states no concerns.          History of Present Illness:  Ms. Rosalva Ellsworth is a 80 year old female who presents as a follow-up for a yearly skin check. The patient was last seen by Dr. Perdomo on 1/7/2020 to discuss possible surgical excision of a cyst on the left side of her cheek. She has since decided not to have this excised and says the cyst has shrunk and she is no longer concerned by it. Today she says that she is here for her yearly exam and only mentions that over the summer she had chigger bites on her hands/arms and abdomen that she thinks she got in [facility] while gardening. A lump on her right shoulder is pointed out to her and she mentions that this is also new over the last couple of months. She doesn't recall any trauma to the shoulder. It was previously more tender, but is only occasionally tender now. She denies any scaly, tender, bleeding spots. Denies any new or changing moles. She denies fevers, chills, night sweats. Overall feeling very well and no other concerns.       Past Medical History:   Patient Active Problem List   Diagnosis     Atypical nevi     BCC (basal cell carcinoma), trunk     Skin exam, screening for cancer     Past Medical History:   Diagnosis Date     Basal cell carcinoma      Past Surgical History:   Procedure Laterality Date     MOHS MICROGRAPHIC PROCEDURE         Social History:  Patient reports that she has never smoked. She has never used  smokeless tobacco.    Family History:  Family History   Problem Relation Age of Onset     Cancer No family hx of         skin cancer     Melanoma No family hx of      Skin Cancer No family hx of        Medications:  Current Outpatient Medications   Medication Sig Dispense Refill     atorvastatin (LIPITOR) 10 MG tablet Take 10 mg by mouth daily.       calcium carbonate (OS-DIVYA 500 MG [place]. [place]) 500 MG tablet Take 500 mg by mouth       chlorpheniramine (WAL-FINATE) 4 MG tablet TK 1 T PO HS PRF ALLERGIES.       cholecalciferol 2000 UNITS tablet Take 2,000 Units by mouth       dicyclomine (BENTYL) 10 MG capsule Take 10 mg by mouth       dorzolamide-timolol (COSOPT) 2-0.5 % ophthalmic solution        estradiol (VAGIFEM) 10 MCG TABS Place 10 mcg vaginally       eszopiclone (LUNESTA) 2 MG tablet        fluticasone (FLONASE) 50 MCG/ACT nasal spray        Levothyroxine Sodium (SYNTHROID PO) Take 80 mg by mouth daily       simvastatin (ZOCOR) 20 MG tablet Take 20 mg by mouth       traZODone (DESYREL) 50 MG tablet Take 50 mg by mouth          Allergies   Allergen Reactions     Penicillins Hives     Sulfa Drugs Unknown     rash         Review of Systems:  -As per HPI  -Constitutional: Otherwise feeling well today, in usual state of health.  -HEENT: Patient denies nonhealing oral sores.  -Skin: As above in HPI. No additional skin concerns.    Physical exam:  Vitals: There were no vitals taken for this visit.  GEN: This is a well developed, well-nourished female in no acute distress, in a pleasant mood.    SKIN: Total skin excluding the undergarment areas was performed. The exam included the head/face, neck, both arms, chest, back, abdomen, both legs, digits and/or nails.   -Choe skin type: II  -Scattered brown macules on sun exposed areas.  -small red papule of left infraorbital cheek  -there is a well-healed surgical scar of left upper chest  -there are faint hyperpigmented macules of the lower abdomen  -there is a firm,  tender subcutaneous mass overlying the right olecranon process  -No other lesions of concern on areas examined.     Impression/Plan:  1. Hx of BCC: well-healed scar of left upper chest without evidence of recurrence.   - Sun precaution was advised including the use of sun screens of SPF 30 or higher, sun protective clothing, and avoidance of tanning beds.    2. Cyst of left cheek: reduced in size, patient opted against excision.     3. Solar lentigines: benign nature discussed, patient reassured. Sun precautions as above.     4. Hx of chigger bites. Discussed etiology of chigger bites and best way to prevent them is to use an insect repellant that contains DEET prior to activities where patient may be exposed.    5. Olecranon bursitis: no concern on exam today for infection. Discussed etiology which typically includes previous traumatic event of elbow. Informed patient that her tender lump should decrease in size slowly over time. If this doesn't occur, patient may opt to see orthopedic surgery given that this may affect the elbow joint as well.     CC Referred Self, MD  No address on file on close of this encounter.  Follow-up in 1 year, earlier for new or changing lesions.       Dr. Rinaldi staffed the patient.    Staff Involved:  Resident(Fernando Roche)/Staff    Fernando oRche MD, PhD  Medicine-Dermatology PGY-5  I, Frances Rinaldi MD, saw this patient with the resident and agree with the resident s findings and plan of care as documented in the resident s note.

## 2020-09-21 NOTE — NURSING NOTE
Dermatology Rooming Note    Rosalva Ellsworth's goals for this visit include:   Chief Complaint   Patient presents with     Derm Problem     Rosalva is here for a skin check, states no concerns.        Dinorah Li LPN

## 2020-09-21 NOTE — PATIENT INSTRUCTIONS
- To prevent chigger bites, make sure to use a bug repellant. Particularly one that contains DEET.     - For the olecranon bursitis, this should slowly go back down over time. If it doesn't come back down, you could see orthopedic surgery as this may affect the elbow joint.

## 2020-09-21 NOTE — LETTER
9/21/2020       RE: Rosalva Ellsworth  [address]  [place] [address]     Dear Colleague,    Thank you for referring your patient, Rosalva Ellsworth, to the [facility] DERMATOLOGY at [facility]. Please see a copy of my visit note below.    [facility] Dermatology Note      Dermatology Problem List:  1. H/o alopecia  2. H/o nodular basal cell carcinoma on left upper chest, s/p Mohs 10/2013, no recurrence  3. Photosensitivity on upper chest  4. Seborrheic keratosis, on chest, back, and legs  5. Actinic keratosis on left ear s/p cryotherapy 9/2018 , resolved  6. Cyst of left cheek, decided against excision.    Encounter Date: Sep 21, 2020    CC:   Chief Complaint   Patient presents with     Derm Problem     Rosalva is here for a skin check, states no concerns.          History of Present Illness:  Ms. Rosalva Ellsworth is a 80 year old female who presents as a follow-up for a yearly skin check. The patient was last seen by Dr. Perdomo on 1/7/2020 to discuss possible surgical excision of a cyst on the left side of her cheek. She has since decided not to have this excised and says the cyst has shrunk and she is no longer concerned by it. Today she says that she is here for her yearly exam and only mentions that over the summer she had chigger bites on her hands/arms and abdomen that she thinks she got in [facility] while gardening. A lump on her right shoulder is pointed out to her and she mentions that this is also new over the last couple of months. She doesn't recall any trauma to the shoulder. It was previously more tender, but is only occasionally tender now. She denies any scaly, tender, bleeding spots. Denies any new or changing moles. She denies fevers, chills, night sweats. Overall feeling very well and no other concerns.       Past Medical History:   Patient Active Problem List   Diagnosis     Atypical nevi     BCC (basal cell  carcinoma), trunk     Skin exam, screening for cancer     Past Medical History:   Diagnosis Date     Basal cell carcinoma      Past Surgical History:   Procedure Laterality Date     MOHS MICROGRAPHIC PROCEDURE         Social History:  Patient reports that she has never smoked. She has never used smokeless tobacco.    Family History:  Family History   Problem Relation Age of Onset     Cancer No family hx of         skin cancer     Melanoma No family hx of      Skin Cancer No family hx of        Medications:  Current Outpatient Medications   Medication Sig Dispense Refill     atorvastatin (LIPITOR) 10 MG tablet Take 10 mg by mouth daily.       calcium carbonate (OS-DIVYA 500 MG [place]. [place]) 500 MG tablet Take 500 mg by mouth       chlorpheniramine (WAL-FINATE) 4 MG tablet TK 1 T PO HS PRF ALLERGIES.       cholecalciferol 2000 UNITS tablet Take 2,000 Units by mouth       dicyclomine (BENTYL) 10 MG capsule Take 10 mg by mouth       dorzolamide-timolol (COSOPT) 2-0.5 % ophthalmic solution        estradiol (VAGIFEM) 10 MCG TABS Place 10 mcg vaginally       eszopiclone (LUNESTA) 2 MG tablet        fluticasone (FLONASE) 50 MCG/ACT nasal spray        Levothyroxine Sodium (SYNTHROID PO) Take 80 mg by mouth daily       simvastatin (ZOCOR) 20 MG tablet Take 20 mg by mouth       traZODone (DESYREL) 50 MG tablet Take 50 mg by mouth          Allergies   Allergen Reactions     Penicillins Hives     Sulfa Drugs Unknown     rash         Review of Systems:  -As per HPI  -Constitutional: Otherwise feeling well today, in usual state of health.  -HEENT: Patient denies nonhealing oral sores.  -Skin: As above in HPI. No additional skin concerns.    Physical exam:  Vitals: There were no vitals taken for this visit.  GEN: This is a well developed, well-nourished female in no acute distress, in a pleasant mood.    SKIN: Total skin excluding the undergarment areas was performed. The exam included the head/face, neck, both arms, chest, back,  abdomen, both legs, digits and/or nails.   -Choe skin type: II  -Scattered brown macules on sun exposed areas.  -small red papule of left infraorbital cheek  -there is a well-healed surgical scar of left upper chest  -there are faint hyperpigmented macules of the lower abdomen  -there is a firm, tender subcutaneous mass overlying the right olecranon process  -No other lesions of concern on areas examined.     Impression/Plan:  1. Hx of BCC: well-healed scar of left upper chest without evidence of recurrence.   - Sun precaution was advised including the use of sun screens of SPF 30 or higher, sun protective clothing, and avoidance of tanning beds.    2. Cyst of left cheek: reduced in size, patient opted against excision.     3. Solar lentigines: benign nature discussed, patient reassured. Sun precautions as above.     4. Hx of chigger bites. Discussed etiology of chigger bites and best way to prevent them is to use an insect repellant that contains DEET prior to activities where patient may be exposed.    5. Olecranon bursitis: no concern on exam today for infection. Discussed etiology which typically includes previous traumatic event of elbow. Informed patient that her tender lump should decrease in size slowly over time. If this doesn't occur, patient may opt to see orthopedic surgery given that this may affect the elbow joint as well.     CC Referred Self, MD  No address on file on close of this encounter.  Follow-up in 1 year, earlier for new or changing lesions.       Dr. Rinaldi staffed the patient.    Staff Involved:  Resident(Fernando Roche)/Staff    Fernando Roche MD, PhD  Medicine-Dermatology PGY-5  I, Frances Rinaldi MD, saw this patient with the resident and agree with the resident s findings and plan of care as documented in the resident s note.

## 2020-09-22 ENCOUNTER — OFFICE VISIT (OUTPATIENT)
Dept: ORTHOPEDICS | Facility: CLINIC | Age: 80
End: 2020-09-22
Payer: COMMERCIAL

## 2020-09-22 VITALS — WEIGHT: 123 LBS | HEIGHT: 64 IN | BODY MASS INDEX: 21 KG/M2

## 2020-09-22 DIAGNOSIS — M70.21 OLECRANON BURSITIS, RIGHT ELBOW: Primary | ICD-10-CM

## 2020-09-22 ASSESSMENT — MIFFLIN-ST. JEOR: SCORE: 1014.98

## 2020-09-22 NOTE — LETTER
9/22/2020         RE: Rosalva Ellsworth  [address] [facility] [address]        Dear Colleague,    Thank you for referring your patient, Rosalva Ellsworth, to the [facility] SPORTS AND ORTHOPAEDIC WALK IN CLINIC. Please see a copy of my visit note below.          SPORTS & ORTHOPEDIC WALK-IN VISIT 9/22/2020    Primary Care Physician: Dr. Sanders    In July - started to notice some R elbow swelling. Mostly notices some pain and discomfort with palpation. No issues with ROM.    No treatment to date.    Reason for visit:     What part of your body is injured / painful?  right elbow    What caused the injury /pain? No inciting event     How long ago did your injury occur or pain begin? several months ago    What are your most bothersome symptoms? Pain and Swelling    How would you characterize your symptom?  aching    What makes your symptoms better? Rest    What makes your symptoms worse? Other: palpation    Have you been previously seen for this problem? No    Medical History:    Any recent changes to your medical history? No    Any new medication prescribed since last visit? No    Have you had surgery on this body part before? No    Social History:    Occupation: "[employer]    Handedness: Right    Exercise: walking, stationary bike, treadmill    Review of Systems:    Do you have fever, chills, weight loss? No    Do you have any vision problems? No    Do you have any chest pain or edema? No    Do you have any shortness of breath or wheezing?  No    Do you have stomach problems? Yes, aware of    Do you have any numbness or focal weakness? No    Do you have diabetes? No    Do you have problems with bleeding or clotting? No    Do you have an rashes or other skin lesions? No       CHIEF COMPLAINT:  Pain of the Right Elbow       HISTORY OF PRESENT ILLNESS  Ms. Ellsworth is a pleasant 80 year old female who presents to clinic today with right elbow swelling.  Rosalva has been experiencing  "swelling in her elbow for the past few months.  She points to the point of her elbow.  This is not painful, rather annoying.  She denies any redness, no warmth.      Additional history: as documented    MEDICAL HISTORY  Patient Active Problem List   Diagnosis     Atypical nevi     BCC (basal cell carcinoma), trunk     Skin exam, screening for cancer       Current Outpatient Medications   Medication Sig Dispense Refill     atorvastatin (LIPITOR) 10 MG tablet Take 10 mg by mouth daily.       calcium carbonate (OS-DIVYA 500 MG [place]. [place]) 500 MG tablet Take 500 mg by mouth       chlorpheniramine (WAL-FINATE) 4 MG tablet TK 1 T PO HS PRF ALLERGIES.       cholecalciferol 2000 UNITS tablet Take 2,000 Units by mouth       dicyclomine (BENTYL) 10 MG capsule Take 10 mg by mouth       dorzolamide-timolol (COSOPT) 2-0.5 % ophthalmic solution        estradiol (VAGIFEM) 10 MCG TABS Place 10 mcg vaginally       eszopiclone (LUNESTA) 2 MG tablet        fluticasone (FLONASE) 50 MCG/ACT nasal spray        Levothyroxine Sodium (SYNTHROID PO) Take 80 mg by mouth daily       simvastatin (ZOCOR) 20 MG tablet Take 20 mg by mouth       traZODone (DESYREL) 50 MG tablet Take 50 mg by mouth         Allergies   Allergen Reactions     Penicillins Hives     Sulfa Drugs Unknown     rash       Family History   Problem Relation Age of Onset     Cancer No family hx of         skin cancer     Melanoma No family hx of      Skin Cancer No family hx of        Additional medical/Social/Surgical histories reviewed in [facility] and updated as appropriate.        PHYSICAL EXAM    Vitals:    09/22/20 1409   Weight: 55.8 kg (123 lb)   Height: 1.613 m (5' 3.5\")     General  - normal appearance, in no obvious distress  HEENT  - conjunctivae not injected, moist mucous membranes  CV  - normal radial pulse  Pulm  - normal respiratory pattern, non-labored  Musculoskeletal - right elbow  - inspection: focal swelling over olecranon  - palpation: mobile olecranon swelling, " not tender  - ROM:  160 deg flexion   0 deg extension   90 deg pronation   90 deg supination  - strength: 5/5  strength, 5/5 flexion, extension, pronation, supination  Neuro  - no sensory or motor deficit, grossly normal coordination, normal muscle tone  Skin  - no ecchymosis, erythema, warmth, or induration, no obvious rash  Psych  - interactive, appropriate, normal mood and affect             ASSESSMENT & PLAN  Ms. Ellsworth is a 80 year old female who presents to clinic today with right elbow swelling.    Her symptoms are consistent with olecranon bursitis.  We discussed pathophysiology and treatment options in some detail.  I did explain that in general aspirations would ideally be avoided, as this may increase the risk of infection.  She is understanding with this and would rather try conservative treatment.  We did provide her with a Tubigrip sleeve, she should wear this at most times.  She should also obtain over-the-counter Voltaren gel.    Her swelling should improve over the coming weeks, if not we may consider following up.    It was a pleasure seeing Rosalva today.    Brad Barber DO, [facility]  Primary Care Sports Medicine      This note was constructed using Dragon dictation software, please excuse any minor errors in spelling, grammar, or syntax.

## 2020-09-22 NOTE — PROGRESS NOTES
SPORTS & ORTHOPEDIC WALK-IN VISIT 9/22/2020    Primary Care Physician: Dr. Sanders    In July - started to notice some R elbow swelling. Mostly notices some pain and discomfort with palpation. No issues with ROM.    No treatment to date.    Reason for visit:     What part of your body is injured / painful?  right elbow    What caused the injury /pain? No inciting event     How long ago did your injury occur or pain begin? several months ago    What are your most bothersome symptoms? Pain and Swelling    How would you characterize your symptom?  aching    What makes your symptoms better? Rest    What makes your symptoms worse? Other: palpation    Have you been previously seen for this problem? No    Medical History:    Any recent changes to your medical history? No    Any new medication prescribed since last visit? No    Have you had surgery on this body part before? No    Social History:    Occupation: [employer]    Handedness: Right    Exercise: walking, stationary bike, treadmill    Review of Systems:    Do you have fever, chills, weight loss? No    Do you have any vision problems? No    Do you have any chest pain or edema? No    Do you have any shortness of breath or wheezing?  No    Do you have stomach problems? Yes, aware of    Do you have any numbness or focal weakness? No    Do you have diabetes? No    Do you have problems with bleeding or clotting? No    Do you have an rashes or other skin lesions? No       CHIEF COMPLAINT:  Pain of the Right Elbow       HISTORY OF PRESENT ILLNESS  Ms. Ellsworth is a pleasant 80 year old female who presents to clinic today with right elbow swelling.  Rosalva has been experiencing swelling in her elbow for the past few months.  She points to the point of her elbow.  This is not painful, rather annoying.  She denies any redness, no warmth.      Additional history: as documented    MEDICAL HISTORY  Patient Active Problem List   Diagnosis     Atypical nevi     BCC  "(basal cell carcinoma), trunk     Skin exam, screening for cancer       Current Outpatient Medications   Medication Sig Dispense Refill     atorvastatin (LIPITOR) 10 MG tablet Take 10 mg by mouth daily.       calcium carbonate (OS-DIVYA 500 MG [place]. [place]) 500 MG tablet Take 500 mg by mouth       chlorpheniramine (WAL-FINATE) 4 MG tablet TK 1 T PO HS PRF ALLERGIES.       cholecalciferol 2000 UNITS tablet Take 2,000 Units by mouth       dicyclomine (BENTYL) 10 MG capsule Take 10 mg by mouth       dorzolamide-timolol (COSOPT) 2-0.5 % ophthalmic solution        estradiol (VAGIFEM) 10 MCG TABS Place 10 mcg vaginally       eszopiclone (LUNESTA) 2 MG tablet        fluticasone (FLONASE) 50 MCG/ACT nasal spray        Levothyroxine Sodium (SYNTHROID PO) Take 80 mg by mouth daily       simvastatin (ZOCOR) 20 MG tablet Take 20 mg by mouth       traZODone (DESYREL) 50 MG tablet Take 50 mg by mouth         Allergies   Allergen Reactions     Penicillins Hives     Sulfa Drugs Unknown     rash       Family History   Problem Relation Age of Onset     Cancer No family hx of         skin cancer     Melanoma No family hx of      Skin Cancer No family hx of        Additional medical/Social/Surgical histories reviewed in [facility] and updated as appropriate.        PHYSICAL EXAM    Vitals:    09/22/20 1409   Weight: 55.8 kg (123 lb)   Height: 1.613 m (5' 3.5\")     General  - normal appearance, in no obvious distress  HEENT  - conjunctivae not injected, moist mucous membranes  CV  - normal radial pulse  Pulm  - normal respiratory pattern, non-labored  Musculoskeletal - right elbow  - inspection: focal swelling over olecranon  - palpation: mobile olecranon swelling, not tender  - ROM:  160 deg flexion   0 deg extension   90 deg pronation   90 deg supination  - strength: 5/5  strength, 5/5 flexion, extension, pronation, supination  Neuro  - no sensory or motor deficit, grossly normal coordination, normal muscle tone  Skin  - no ecchymosis, " erythema, warmth, or induration, no obvious rash  Psych  - interactive, appropriate, normal mood and affect             ASSESSMENT & PLAN  Ms. Ellsworth is a 80 year old female who presents to clinic today with right elbow swelling.    Her symptoms are consistent with olecranon bursitis.  We discussed pathophysiology and treatment options in some detail.  I did explain that in general aspirations would ideally be avoided, as this may increase the risk of infection.  She is understanding with this and would rather try conservative treatment.  We did provide her with a Tubigrip sleeve, she should wear this at most times.  She should also obtain over-the-counter Voltaren gel.    Her swelling should improve over the coming weeks, if not we may consider following up.    It was a pleasure seeing Rosalva today.    Brad Barber DO, [facility]  Primary Care Sports Medicine      This note was constructed using Dragon dictation software, please excuse any minor errors in spelling, grammar, or syntax.

## 2021-04-21 ENCOUNTER — OFFICE VISIT (OUTPATIENT)
Dept: DERMATOLOGY | Facility: CLINIC | Age: 81
End: 2021-04-21
Payer: COMMERCIAL

## 2021-04-21 DIAGNOSIS — L82.0 INFLAMED SEBORRHEIC KERATOSIS: ICD-10-CM

## 2021-04-21 DIAGNOSIS — Z85.828 HISTORY OF SKIN CANCER: Primary | ICD-10-CM

## 2021-04-21 PROCEDURE — 99213 OFFICE O/P EST LOW 20 MIN: CPT | Mod: 25 | Performed by: DERMATOLOGY

## 2021-04-21 PROCEDURE — 17110 DESTRUCTION B9 LES UP TO 14: CPT | Performed by: DERMATOLOGY

## 2021-04-21 ASSESSMENT — PAIN SCALES - GENERAL: PAINLEVEL: NO PAIN (0)

## 2021-04-21 NOTE — LETTER
4/21/2021       RE: Rosalva Ellsworth  [address] [place] [facility] [address]     Dear Colleague,    Thank you for referring your patient, Rosalva Ellsworth, to the [facility] DERMATOLOGY CLINIC [place] at [facility]. Please see a copy of my visit note below.    [facility] Dermatology Note  Encounter Date: Apr 21, 2021  Office Visit     Dermatology Problem List:  1. H/o alopecia  2. H/o nodular basal cell carcinoma on left upper chest, s/p Mohs 10/2013, no recurrence  3. Photosensitivity on upper chest  4. Seborrheic keratosis, on chest, back, and legs  5. Actinic keratosis on left ear s/p cryotherapy 9/2018 , resolved  6. Cyst of left cheek, decided against excision.    ____________________________________________    Assessment & Plan:     # inflamed seborrheic keratoses right temple, neck  - Cryotherapy performed today (see procedure note(s) below).       # History of skin cancer- NERD.       Procedures Performed:   - Cryotherapy procedure note, location(s): right temple, neck. After verbal consent and discussion of risks and benefits including, but not limited to, dyspigmentation/scar, blister, and pain, 2 lesion(s) was(were) treated with 1-2 mm freeze border for 1-2 cycles with liquid nitrogen. Post cryotherapy instructions were provided.      Follow-up: 1 year(s) in-person, or earlier for new or changing lesions    Staff:     Frances Rinaldi MD  ____________________________________________    CC: RECHECK (pt states she is here for a full body skin check, skin tag right side of pt neck)    HPI:  Ms. Rosalva Ellsworth is a(n) 81 year old female who presents today as a return patient for a skin check.  She notes an irritating area of her neck which pulls at her skin and a spot on the right temple which increased in size.  No other concerns.  No bleeding lesions.    Patient is otherwise feeling well, without  additional skin concerns.     Labs Reviewed:  N/A    Physical Exam:  Vitals: There were no vitals taken for this visit.  SKIN: Total skin excluding the undergarment areas was performed. The exam included the head/face, neck, both arms, chest, back, abdomen, both legs, digits and/or nails.   - Scattered brown macules on sun exposed areas..   - There are tan to brown waxy stuck on papules with surrounding erythema on the right temple and neck.  Other non inflamed waxy papules scattered on skin exam.   - There is no erythema, telangectasias, nodularity, or pigmentation on the skin cancer site.  -Benign appearing nevi  - No other lesions of concern on areas examined.     Medications:  Current Outpatient Medications   Medication     atorvastatin (LIPITOR) 10 MG tablet     calcium carbonate (OS-DIVYA 500 MG [place]. [place]) 500 MG tablet     chlorpheniramine (WAL-FINATE) 4 MG tablet     cholecalciferol 2000 UNITS tablet     dorzolamide-timolol (COSOPT) 2-0.5 % ophthalmic solution     estradiol (VAGIFEM) 10 MCG TABS     eszopiclone (LUNESTA) 2 MG tablet     Levothyroxine Sodium (SYNTHROID PO)     simvastatin (ZOCOR) 20 MG tablet     traZODone (DESYREL) 50 MG tablet     dicyclomine (BENTYL) 10 MG capsule     fluticasone (FLONASE) 50 MCG/ACT nasal spray     No current facility-administered medications for this visit.       Past Medical History:   Patient Active Problem List   Diagnosis     Atypical nevi     BCC (basal cell carcinoma), trunk     Skin exam, screening for cancer     Past Medical History:   Diagnosis Date     Basal cell carcinoma        CC Referred Self, MD  No address on file on close of this encounter.

## 2021-04-21 NOTE — NURSING NOTE
Chief Complaint   Patient presents with     RECHECK     pt states she is here for a full body skin check, skin tag right side of pt neck     Christal Connelly MA

## 2021-04-21 NOTE — PROGRESS NOTES
[facility] Dermatology Note  Encounter Date: Apr 21, 2021  Office Visit     Dermatology Problem List:  1. H/o alopecia  2. H/o nodular basal cell carcinoma on left upper chest, s/p Mohs 10/2013, no recurrence  3. Photosensitivity on upper chest  4. Seborrheic keratosis, on chest, back, and legs  5. Actinic keratosis on left ear s/p cryotherapy 9/2018 , resolved  6. Cyst of left cheek, decided against excision.    ____________________________________________    Assessment & Plan:     # inflamed seborrheic keratoses right temple, neck  - Cryotherapy performed today (see procedure note(s) below).       # History of skin cancer- NERD.       Procedures Performed:   - Cryotherapy procedure note, location(s): right temple, neck. After verbal consent and discussion of risks and benefits including, but not limited to, dyspigmentation/scar, blister, and pain, 2 lesion(s) was(were) treated with 1-2 mm freeze border for 1-2 cycles with liquid nitrogen. Post cryotherapy instructions were provided.      Follow-up: 1 year(s) in-person, or earlier for new or changing lesions    Staff:     Frances Rinaldi MD  ____________________________________________    CC: RECHECK (pt states she is here for a full body skin check, skin tag right side of pt neck)    HPI:  Ms. Rosalva Ellsworth is a(n) 81 year old female who presents today as a return patient for a skin check.  She notes an irritating area of her neck which pulls at her skin and a spot on the right temple which increased in size.  No other concerns.  No bleeding lesions.    Patient is otherwise feeling well, without additional skin concerns.     Labs Reviewed:  N/A    Physical Exam:  Vitals: There were no vitals taken for this visit.  SKIN: Total skin excluding the undergarment areas was performed. The exam included the head/face, neck, both arms, chest, back, abdomen, both legs, digits and/or nails.   - Scattered brown macules on sun exposed areas..   -  There are tan to brown waxy stuck on papules with surrounding erythema on the right temple and neck.  Other non inflamed waxy papules scattered on skin exam.   - There is no erythema, telangectasias, nodularity, or pigmentation on the skin cancer site.  -Benign appearing nevi  - No other lesions of concern on areas examined.     Medications:  Current Outpatient Medications   Medication     atorvastatin (LIPITOR) 10 MG tablet     calcium carbonate (OS-DIVYA 500 MG [place]. [place]) 500 MG tablet     chlorpheniramine (WAL-FINATE) 4 MG tablet     cholecalciferol 2000 UNITS tablet     dorzolamide-timolol (COSOPT) 2-0.5 % ophthalmic solution     estradiol (VAGIFEM) 10 MCG TABS     eszopiclone (LUNESTA) 2 MG tablet     Levothyroxine Sodium (SYNTHROID PO)     simvastatin (ZOCOR) 20 MG tablet     traZODone (DESYREL) 50 MG tablet     dicyclomine (BENTYL) 10 MG capsule     fluticasone (FLONASE) 50 MCG/ACT nasal spray     No current facility-administered medications for this visit.       Past Medical History:   Patient Active Problem List   Diagnosis     Atypical nevi     BCC (basal cell carcinoma), trunk     Skin exam, screening for cancer     Past Medical History:   Diagnosis Date     Basal cell carcinoma        CC Referred Self, MD  No address on file on close of this encounter.

## 2021-04-21 NOTE — PATIENT INSTRUCTIONS
Cryotherapy    What is it?    Use of a very cold liquid, such as liquid nitrogen, to freeze and destroy abnormal skin cells that need to be removed    What should I expect?    Tenderness and redness    A small blister that might grow and fill with dark purple blood. There may be crusting.    More than one treatment may be needed if the lesions do not go away.    How do I care for the treated area?    Gently wash the area with your hands when bathing.    Use a thin layer of Vaseline to help with healing. You may use a Band-Aid.     The area should heal within 7-10 days and may leave behind a pink or lighter color.     Do not use an antibiotic or Neosporin ointment.     You may take acetaminophen (Tylenol) for pain.     Call your Doctor if you have:    Severe pain    Signs of infection (warmth, redness, cloudy yellow drainage, and or a bad smell)    Questions or concerns    Who should I call with questions?       [facility]: [phone]       [facility]: [phone]       For urgent needs outside of business hours call the [facility] at [phone]        and ask for the dermatology resident on call

## 2022-04-04 ENCOUNTER — OFFICE VISIT (OUTPATIENT)
Dept: DERMATOLOGY | Facility: CLINIC | Age: 82
End: 2022-04-04
Payer: COMMERCIAL

## 2022-04-04 DIAGNOSIS — L82.0 INFLAMED SEBORRHEIC KERATOSIS: ICD-10-CM

## 2022-04-04 DIAGNOSIS — Z85.828 HISTORY OF SKIN CANCER: Primary | ICD-10-CM

## 2022-04-04 PROCEDURE — 17110 DESTRUCTION B9 LES UP TO 14: CPT | Mod: GC | Performed by: DERMATOLOGY

## 2022-04-04 PROCEDURE — 99213 OFFICE O/P EST LOW 20 MIN: CPT | Mod: 25 | Performed by: DERMATOLOGY

## 2022-04-04 RX ORDER — LATANOPROSTENE BUNOD 0.24 MG/ML
SOLUTION/ DROPS OPHTHALMIC
COMMUNITY
Start: 2022-03-23

## 2022-04-04 ASSESSMENT — PAIN SCALES - GENERAL: PAINLEVEL: NO PAIN (0)

## 2022-04-04 NOTE — PATIENT INSTRUCTIONS
Cryotherapy    What is it?    Use of a very cold liquid, such as liquid nitrogen, to freeze and destroy abnormal skin cells that need to be removed    What should I expect?    Tenderness and redness    A small blister that might grow and fill with dark purple blood. There may be crusting.    More than one treatment may be needed if the lesions do not go away.    How do I care for the treated area?    Gently wash the area with your hands when bathing.    Use a thin layer of Vaseline to help with healing. You may use a Band-Aid.     The area should heal within 7-10 days and may leave behind a pink or lighter color.     Do not use an antibiotic or Neosporin ointment.     You may take acetaminophen (Tylenol) for pain.     Call your doctor if you have:    Severe pain    Signs of infection (warmth, redness, cloudy yellow drainage, and or a bad smell)    Questions or concerns    Who should I call with questions?       [facility]: [phone]       [facility]: [phone]       For urgent needs outside of business hours call the [facility] at [phone] and ask for the dermatology resident on call

## 2022-04-04 NOTE — PROGRESS NOTES
[facility] Dermatology Note  Encounter Date: Apr 4, 2022  Office Visit     Dermatology Problem List:  1. H/o alopecia  2. H/o nodular basal cell carcinoma on left upper chest, s/p Mohs 10/2013, no recurrence  3. Photosensitivity on upper chest  4. Seborrheic keratosis, on chest, back, and legs  - ISK L shoulder tx w/ cryo 4/4/22  5. Actinic keratosis on left ear s/p cryotherapy 9/2018 , resolved  6. Cyst of left cheek, decided against excision.    ____________________________________________    Assessment & Plan:     # History of NMSC  - NERD  - Sun protection advised    # inflamed seborrheic keratosis, L shoulder  - Signs and symptoms of NMSC discussed  - Cryotherapy performed today (see procedure note(s) below).    # cyst, midline neck  - reassurance    # Benign lesions (cherry angiomas, seborrheic keratoses, lentigines, clinically banal appearing melanocytic nevi)  - Reassurance provided and benign nature of condition discussed  - ABCDs of melanoma were discussed and self skin checks were advised  - Sun precaution was advised including the use of sun screens of SPF 30 or higher, sun protective clothing, and avoidance of tanning beds      Procedures Performed:   - Cryotherapy procedure note, location(s): L shoulder. After verbal consent and discussion of risks and benefits including, but not limited to, dyspigmentation/scar, blister, and pain, 1 lesion(s) was(were) treated with 1-2 mm freeze border for 2 cycles with liquid nitrogen. Post cryotherapy instructions were provided.    Follow-up: 1 year(s) in-person, or earlier for new or changing lesions    Staff and Resident:     Staffed with Dr. Nimco Gold MD  Dermatology, PGY-2  I was present for the entire procedure. Frances Rinaldi MD  I, Frances Rinaldi MD, saw this patient with the resident and agree with the resident s findings and plan of care as documented in the resident s  note.    ____________________________________________    CC: Skin Check (Rosalva is here today for full body skin check, reports no concerns today )    HPI:  Ms. Rosalva Ellsworth is a(n) 82 year old female who presents today as a return patient for skin check.  - Scaly spot on L shoulder that is occasionally irritated.  - Small pinpoint bump on midline neck. Not otherwise bothersome but does not like cosmetic appearance    Both lesions present for a long time without changes, growth, or bleeding. Patient is otherwise feeling well, without additional skin concerns.    Labs Reviewed:  N/A    Physical Exam:  Vitals: There were no vitals taken for this visit.  SKIN: Full skin, which includes the head/face, both arms, chest, back, abdomen,both legs, genitalia and/or groin buttocks, digits and/or nails, was examined.  - Scattered brown macules on sun exposed areas.  - Benign appearing nevi  - There is a tan to brown waxy stuck on papules with surrounding erythema on the L shoulder. Other non inflamed waxy papules scattered on skin exam.   - There is no erythema, telangectasias, nodularity, or pigmentation on the skin cancer site.  - No other lesions of concern on areas examined.     Medications:  Current Outpatient Medications   Medication     atorvastatin (LIPITOR) 10 MG tablet     calcium carbonate (OS-DIVYA 500 MG [place]. [place]) 500 MG tablet     cholecalciferol 2000 UNITS tablet     chlorpheniramine (WAL-FINATE) 4 MG tablet     dicyclomine (BENTYL) 10 MG capsule     dorzolamide-timolol (COSOPT) 2-0.5 % ophthalmic solution     estradiol (VAGIFEM) 10 MCG TABS     eszopiclone (LUNESTA) 2 MG tablet     fluticasone (FLONASE) 50 MCG/ACT nasal spray     Levothyroxine Sodium (SYNTHROID PO)     simvastatin (ZOCOR) 20 MG tablet     traZODone (DESYREL) 50 MG tablet     No current facility-administered medications for this visit.      Past Medical History:   Patient Active Problem List   Diagnosis     Atypical nevi     BCC (basal  cell carcinoma), trunk     Skin exam, screening for cancer     Past Medical History:   Diagnosis Date     Basal cell carcinoma

## 2022-04-04 NOTE — LETTER
4/4/2022       RE: Rosalva Ellsworth  [address]  [facility] [address]     Dear Colleague,    Thank you for referring your patient, Rosalva Ellsworth, to the [facility] DERMATOLOGY CLINIC [place] at [facility]. Please see a copy of my visit note below.    [facility] Dermatology Note  Encounter Date: Apr 4, 2022  Office Visit     Dermatology Problem List:  1. H/o alopecia  2. H/o nodular basal cell carcinoma on left upper chest, s/p Mohs 10/2013, no recurrence  3. Photosensitivity on upper chest  4. Seborrheic keratosis, on chest, back, and legs  - ISK L shoulder tx w/ cryo 4/4/22  5. Actinic keratosis on left ear s/p cryotherapy 9/2018 , resolved  6. Cyst of left cheek, decided against excision.    ____________________________________________    Assessment & Plan:     # History of NMSC  - NERD  - Sun protection advised    # inflamed seborrheic keratosis, L shoulder  - Signs and symptoms of NMSC discussed  - Cryotherapy performed today (see procedure note(s) below).    # cyst, midline neck  - reassurance    # Benign lesions (cherry angiomas, seborrheic keratoses, lentigines, clinically banal appearing melanocytic nevi)  - Reassurance provided and benign nature of condition discussed  - ABCDs of melanoma were discussed and self skin checks were advised  - Sun precaution was advised including the use of sun screens of SPF 30 or higher, sun protective clothing, and avoidance of tanning beds      Procedures Performed:   - Cryotherapy procedure note, location(s): L shoulder. After verbal consent and discussion of risks and benefits including, but not limited to, dyspigmentation/scar, blister, and pain, 1 lesion(s) was(were) treated with 1-2 mm freeze border for 2 cycles with liquid nitrogen. Post cryotherapy instructions were provided.    Follow-up: 1 year(s) in-person, or earlier for new or changing lesions    Staff and  Resident:     Staffed with Dr. Nimco Gold MD  Dermatology, PGY-2  I was present for the entire procedure. Frances Rinaldi MD  I, Frances Rinaldi MD, saw this patient with the resident and agree with the resident s findings and plan of care as documented in the resident s note.    ____________________________________________    CC: Skin Check (Rosalva is here today for full body skin check, reports no concerns today )    HPI:  Ms. Rosalva Ellsworth is a(n) 82 year old female who presents today as a return patient for skin check.  - Scaly spot on L shoulder that is occasionally irritated.  - Small pinpoint bump on midline neck. Not otherwise bothersome but does not like cosmetic appearance    Both lesions present for a long time without changes, growth, or bleeding. Patient is otherwise feeling well, without additional skin concerns.    Labs Reviewed:  N/A    Physical Exam:  Vitals: There were no vitals taken for this visit.  SKIN: Full skin, which includes the head/face, both arms, chest, back, abdomen,both legs, genitalia and/or groin buttocks, digits and/or nails, was examined.  - Scattered brown macules on sun exposed areas.  - Benign appearing nevi  - There is a tan to brown waxy stuck on papules with surrounding erythema on the L shoulder. Other non inflamed waxy papules scattered on skin exam.   - There is no erythema, telangectasias, nodularity, or pigmentation on the skin cancer site.  - No other lesions of concern on areas examined.     Medications:  Current Outpatient Medications   Medication     atorvastatin (LIPITOR) 10 MG tablet     calcium carbonate (OS-DIVYA 500 MG [place]. [place]) 500 MG tablet     cholecalciferol 2000 UNITS tablet     chlorpheniramine (WAL-FINATE) 4 MG tablet     dicyclomine (BENTYL) 10 MG capsule     dorzolamide-timolol (COSOPT) 2-0.5 % ophthalmic solution     estradiol (VAGIFEM) 10 MCG TABS     eszopiclone (LUNESTA) 2 MG tablet     fluticasone (FLONASE) 50 MCG/ACT  nasal spray     Levothyroxine Sodium (SYNTHROID PO)     simvastatin (ZOCOR) 20 MG tablet     traZODone (DESYREL) 50 MG tablet     No current facility-administered medications for this visit.      Past Medical History:   Patient Active Problem List   Diagnosis     Atypical nevi     BCC (basal cell carcinoma), trunk     Skin exam, screening for cancer     Past Medical History:   Diagnosis Date     Basal cell carcinoma

## 2022-04-04 NOTE — NURSING NOTE
Dermatology Rooming Note    Rosalva Ellsworth's goals for this visit include:   Chief Complaint   Patient presents with     Skin Check     Rosalva is here today for full body skin check, reports no concerns today      Alena Turner, EMT

## 2023-04-04 ENCOUNTER — OFFICE VISIT (OUTPATIENT)
Dept: DERMATOLOGY | Facility: CLINIC | Age: 83
End: 2023-04-04
Payer: COMMERCIAL

## 2023-04-04 DIAGNOSIS — L84 CORN OF FOOT: ICD-10-CM

## 2023-04-04 DIAGNOSIS — D22.9 MULTIPLE NEVI: ICD-10-CM

## 2023-04-04 DIAGNOSIS — L81.4 LENTIGO: ICD-10-CM

## 2023-04-04 DIAGNOSIS — L82.1 SEBORRHEIC KERATOSES: ICD-10-CM

## 2023-04-04 DIAGNOSIS — Z12.83 SKIN EXAM FOR MALIGNANT NEOPLASM: Primary | ICD-10-CM

## 2023-04-04 DIAGNOSIS — D18.01 CHERRY ANGIOMA: ICD-10-CM

## 2023-04-04 PROCEDURE — 99213 OFFICE O/P EST LOW 20 MIN: CPT | Mod: GC | Performed by: DERMATOLOGY

## 2023-04-04 ASSESSMENT — PAIN SCALES - GENERAL: PAINLEVEL: NO PAIN (0)

## 2023-04-04 NOTE — LETTER
4/4/2023       RE: Rosalva Ellsworth  [address]  [facility] [address]     Dear Colleague,    Thank you for referring your patient, Rosalva Ellsworth, to the [facility] DERMATOLOGY CLINIC [place] at [facility]. Please see a copy of my visit note below.    [facility] Dermatology Note   Encounter Date: Apr 4, 2023  Office visit    Dermatology Problem List:    Last FBSE: 4/4/23    # H/o alopecia  # H/o nodular basal cell carcinoma on left upper chest, s/p Mohs 10/2013, no recurrence  # Photosensitivity on upper chest  # Seborrheic keratosis, on chest, back, and legs  - ISK L shoulder tx w/ cryo 4/4/22  # Actinic keratosis on left ear s/p cryotherapy 9/2018 , resolved  # Cyst of left cheek, decided against excision.  # Slater, R foot  - Current Tx: urea 40% cream    ___________________________________________    Assessment & Plan:    # Multiple clinically banal appearing nevi  # Lentigo  Discussed the possible etiologies, clinical course, and management options of this benign condition with the patient.  - Reviewed the ABCDEs of melanoma  - Recommend daily sunscreen use with SPF at least 30    # Seborrheic keratoses  # Cherry angiomata  # Corn  Discussed the possible etiologies, clinical course, and management options of this benign condition with the patient.  - Reassurance provided  - Recommended to apply urea 40% cream to spot on R foot     Procedures Performed:  None    Follow-up: 1y    Staff Involved:  Patient was seen and staffed with attending physician Dr. Nimco Dietrich MD  Med/Derm Resident PGY-5  P:[phone]    Staff Addendum:  I, Frances Rinaldi MD, saw this patient with the resident and agree with the resident s findings and plan of care as documented in the resident s note.    ___________________________________________      CC: Skin Check (Areas of concern include back of  head)      HPI:  Ms. Rosalva Ellsworth is a(n) 83 year old female who presents to clinic today for FBSE. Reports:  - no major concerns on skin  - has a spot on her R foot  - associated with pain with walking  - wants to make sure it's not a wart  - reports that she and her  are moving to [facility] [place] soon permanently  - otherwise feeling well in usual state of health    Physical exam:  General: in no acute distress, well-developed, well-nourished  Skin:  - skin type: fair  - lichenified spot on pressure point of R foot with central cornified spot  - multiple light brown to tan papules with reassuring pigment network on dermoscopy on trunk and extremities  - light brown macules on sun exposed skin  - tan to light brown waxy stuck on papules and plaques on trunk and extremities  - red or purple papules that akin with diascopy on trunk  - No other lesions of concern on areas examined.     Medications:  Current Outpatient Medications   Medication    atorvastatin (LIPITOR) 10 MG tablet    calcium carbonate (OS-DIVYA 500 MG [place]. [place]) 500 MG tablet    cholecalciferol 2000 UNITS tablet    estradiol (VAGIFEM) 10 MCG TABS    fluticasone (FLONASE) 50 MCG/ACT nasal spray    Levothyroxine Sodium (SYNTHROID PO)    simvastatin (ZOCOR) 20 MG tablet    traZODone (DESYREL) 50 MG tablet    VYZULTA 0.024 % SOLN ophthalmic solution    chlorpheniramine (WAL-FINATE) 4 MG tablet    dicyclomine (BENTYL) 10 MG capsule    dorzolamide-timolol (COSOPT) 2-0.5 % ophthalmic solution    eszopiclone (LUNESTA) 2 MG tablet     No current facility-administered medications for this visit.      Past Medical History:   Patient Active Problem List   Diagnosis    Atypical nevi    BCC (basal cell carcinoma), trunk    Skin exam, screening for cancer     Past Medical History:   Diagnosis Date    Basal cell carcinoma        CC Referred Self, MD  No address on file on close of this encounter.

## 2023-04-04 NOTE — NURSING NOTE
Chief Complaint   Patient presents with     Skin Check     Areas of concern include back of head     Aldo Lyons, EMT

## 2023-04-04 NOTE — PROGRESS NOTES
[facility] Dermatology Note   Encounter Date: Apr 4, 2023  Office visit    Dermatology Problem List:    Last FBSE: 4/4/23    # H/o alopecia  # H/o nodular basal cell carcinoma on left upper chest, s/p Mohs 10/2013, no recurrence  # Photosensitivity on upper chest  # Seborrheic keratosis, on chest, back, and legs  - ISK L shoulder tx w/ cryo 4/4/22  # Actinic keratosis on left ear s/p cryotherapy 9/2018 , resolved  # Cyst of left cheek, decided against excision.  # [place], R foot  - Current Tx: urea 40% cream    ___________________________________________    Assessment & Plan:    # Multiple clinically banal appearing nevi  # Lentigo  Discussed the possible etiologies, clinical course, and management options of this benign condition with the patient.  - Reviewed the ABCDEs of melanoma  - Recommend daily sunscreen use with SPF at least 30    # Seborrheic keratoses  # Cherry angiomata  # Corn  Discussed the possible etiologies, clinical course, and management options of this benign condition with the patient.  - Reassurance provided  - Recommended to apply urea 40% cream to spot on R foot     Procedures Performed:  None    Follow-up: 1y    Staff Involved:  Patient was seen and staffed with attending physician Dr. Nimco Dietrich MD  Med/Derm Resident PGY-5  P:[phone]    Staff Addendum:  I, Frances Rinaldi MD, saw this patient with the resident and agree with the resident s findings and plan of care as documented in the resident s note.    ___________________________________________      CC: Skin Check (Areas of concern include back of head)      HPI:  Ms. Rosalva Ellsworth is a(n) 83 year old female who presents to clinic today for FBSE. Reports:  - no major concerns on skin  - has a spot on her R foot  - associated with pain with walking  - wants to make sure it's not a wart  - reports that she and her  are moving to [facility] soon permanently  - otherwise feeling well  in usual state of health    Physical exam:  General: in no acute distress, well-developed, well-nourished  Skin:  - skin type: fair  - lichenified spot on pressure point of R foot with central cornified spot  - multiple light brown to tan papules with reassuring pigment network on dermoscopy on trunk and extremities  - light brown macules on sun exposed skin  - tan to light brown waxy stuck on papules and plaques on trunk and extremities  - red or purple papules that akin with diascopy on trunk  - No other lesions of concern on areas examined.     Medications:  Current Outpatient Medications   Medication     atorvastatin (LIPITOR) 10 MG tablet     calcium carbonate (OS-DIVYA 500 MG [place]) 500 MG tablet     cholecalciferol 2000 UNITS tablet     estradiol (VAGIFEM) 10 MCG TABS     fluticasone (FLONASE) 50 MCG/ACT nasal spray     Levothyroxine Sodium (SYNTHROID PO)     simvastatin (ZOCOR) 20 MG tablet     traZODone (DESYREL) 50 MG tablet     VYZULTA 0.024 % SOLN ophthalmic solution     chlorpheniramine (WAL-FINATE) 4 MG tablet     dicyclomine (BENTYL) 10 MG capsule     dorzolamide-timolol (COSOPT) 2-0.5 % ophthalmic solution     eszopiclone (LUNESTA) 2 MG tablet     No current facility-administered medications for this visit.      Past Medical History:   Patient Active Problem List   Diagnosis     Atypical nevi     BCC (basal cell carcinoma), trunk     Skin exam, screening for cancer     Past Medical History:   Diagnosis Date     Basal cell carcinoma        CC Referred Self, MD  No address on file on close of this encounter.

## 2023-04-04 NOTE — PATIENT INSTRUCTIONS
For the spot on your right foot, you can obtain urea 40% cream and apply to the corn on your foot after showering!  - make sure to also get some circular bandages to get the pressure off your feet